# Patient Record
Sex: FEMALE | Race: WHITE | URBAN - METROPOLITAN AREA
[De-identification: names, ages, dates, MRNs, and addresses within clinical notes are randomized per-mention and may not be internally consistent; named-entity substitution may affect disease eponyms.]

---

## 2018-04-17 ENCOUNTER — TRANSFERRED RECORDS (OUTPATIENT)
Dept: HEALTH INFORMATION MANAGEMENT | Facility: CLINIC | Age: 63
End: 2018-04-17

## 2018-04-18 ENCOUNTER — TRANSFERRED RECORDS (OUTPATIENT)
Dept: HEALTH INFORMATION MANAGEMENT | Facility: CLINIC | Age: 63
End: 2018-04-18

## 2018-04-26 ENCOUNTER — TRANSFERRED RECORDS (OUTPATIENT)
Dept: HEALTH INFORMATION MANAGEMENT | Facility: CLINIC | Age: 63
End: 2018-04-26

## 2018-05-01 ENCOUNTER — TRANSFERRED RECORDS (OUTPATIENT)
Dept: HEALTH INFORMATION MANAGEMENT | Facility: CLINIC | Age: 63
End: 2018-05-01

## 2018-05-02 ENCOUNTER — PRE VISIT (OUTPATIENT)
Dept: ONCOLOGY | Facility: CLINIC | Age: 63
End: 2018-05-02

## 2018-05-02 NOTE — TELEPHONE ENCOUNTER
1.  Date of consult: 5/24/18    2.  Reason for consult: Endometrial carcinoma    3.  Referring provider/facility: Mayi Young - [facility]    4. Scheduled by: Clinic       5.  Outside records requested from: [facility]    6.  Additional Information:

## 2018-05-04 NOTE — TELEPHONE ENCOUNTER
5/4/18 - Received pathology slides from [facility] - sent to [facility].

## 2018-05-07 PROCEDURE — 00000346 ZZHCL STATISTIC REVIEW OUTSIDE SLIDES TC 88321: Performed by: OBSTETRICS & GYNECOLOGY

## 2018-05-16 LAB — COPATH REPORT: NORMAL

## 2018-05-23 NOTE — PROGRESS NOTES
Consult Notes on Referred Patient        Dr. Mayi Young MD  [facility]  [address] [place] [address]       RE: Jasmin Lopez  : 1955  ZULMA: 2018    Dear Dr. Mayi Young:    I had the pleasure of seeing your patient Jasmin Lopez here at the Gynecologic Cancer Clinic at the [facility] on 2018.  As you know she is a very pleasant 62 year old woman with a recent diagnosis of grade 1 endometrial adenocarcinoma.  Given these findings she was subsequently sent to the Gynecologic Cancer Clinic for new patient consultation.  She is accompanied today by her  and daughter.    Patient presented with PMB.    18:  US pelvis:  Uterus measures 9.1 x 4.3 x 5.4 cm with EMS of 19 mm.  There is a 3.8 cm posterior uterine fibroid.  Non-visualization of the ovaries    18:  EMB:  Endometrial yqjzrbwtdldcbm-Lr-aaqy at the [place], Grade 1 endometrial adenocarcinoma      Review of Systems:  Systemic           no weight changes; no fever; no chills; no night sweats; no appetite changes  Skin           no rashes, or lesions; + changes in moles  Eye           no irritation; no changes in vision; + spots/floaters  Yoshi-Laryngeal           no dysphagia; no hoarseness; + ringing in ears  Pulmonary    no cough; no shortness of breath  Cardiovascular    no chest pain; no palpitations  Gastrointestinal    no diarrhea; no constipation; no abdominal pain; no changes in bowel  habits; no blood in stool  Genitourinary   no urinary frequency; no urinary urgency; no dysuria; no pain; no abnormal vaginal discharge; + abnormal vaginal bleeding  Breast    no breast discharge; no breast changes; no breast pain  Musculoskeletal    no myalgias; no arthralgias; no back pain  Psychiatric           no depressed mood; no anxiety    Hematologic            no tender lymph nodes; no noticeable swellings or lumps   Endocrine    no hot flashes; no heat/cold intolerance          Neurological   no tremor; no numbness and tingling; no headaches; + difficulty sleeping; + dizziness    Obstetrics and Gynecology History:  ,  x 3  No HRT      Past Medical History:  Past Medical History:   Diagnosis Date     Depression      Diabetes (H)      Endometrial cancer (H)      Hypothyroid        Past Surgical History:  Past Surgical History:   Procedure Laterality Date     bowel obstruction      no laparotomy     CHOLECYSTECTOMY         Health Maintenance:  Last Pap Smear: 18              Result: normal, HPV negative  She has had a history of abnormal Pap smears.    Last Mammogram: Several years ago              Result: normal      She has not had a history of abnormal mammograms.    Last Colonoscopy: Never                    Current Medications:   has a current medication list which includes the following prescription(s): aspirin, calcium citrate-vitamin d, cetirizine, levothyroxine sodium, losartan potassium, metformin hcl, multiple vitamins-minerals, psyllium, sertraline hcl, simvastatin, and UNABLE TO FIND.     Allergies:   Amoxicillin; Augmentin; and Bactrim [sulfamethoxazole w/trimethoprim]      Social History:  Social History     Social History     Marital status:      Spouse name: N/A     Number of children: N/A     Years of education: N/A     Occupational History     Not on file.     Social History Main Topics     Smoking status: Never Smoker     Smokeless tobacco: Not on file     Alcohol use No     Drug use: No     Sexual activity: Not on file     Other Topics Concern     Not on file     Social History Narrative     No narrative on file       Lives with , feels safe at home.  Works part time as a sitter at the hospital.  Enjoys going out to eat, walking, going to [employer].  Does not have an advanced directive on file and would like her  to be her POA.  Full code.    Family History:   The patient's family history is significant for.  Family History   Problem  Relation Age of Onset     Thyroid Cancer Sister          Physical Exam:   /81  Pulse 82  Temp 98  F (36.7  C)  Resp 15  Ht 1.524 m (5')  Wt 73 kg (161 lb)  SpO2 96%  BMI 31.44 kg/m2  Body mass index is 31.44 kg/(m^2).    General Appearance: healthy and alert, no distress     HEENT:  no thyromegaly, no palpable nodules or masses        Cardiovascular: regular rate and rhythm, no gallops, rubs or murmurs     Respiratory: lungs clear, no rales, rhonchi or wheezes, normal diaphragmatic excursion    Musculoskeletal: extremities non tender and without edema    Skin: no lesions or rashes     Neurological: normal gait, no gross defects     Psychiatric: appropriate mood and affect                               Hematological: normal cervical, supraclavicular and inguinal lymph nodes     Gastrointestinal:       abdomen soft, non-tender, non-distended, no organomegaly or masses    Genitourinary: External genitalia and urethral meatus appears normal.  Vagina is smooth without nodularity or masses.  Cervix appears normal and without lesions.  Bimanual exam reveal no masses, nodularity or fullness.  Recto-vaginal exam confirms these findings.      Assessment:    Jasmin Lopez is a 62 year old woman with a new diagnosis of grade 1 endometrial adenocarcinoma.     A total of 60 minutes was spent with the patient, >50% of which were spent in counseling the patient and/or treatment planning.      Plan:     1.)    Grade 1 endometrial adenocarcinoma. Discussed natural history and progression of disease.  Discussed standard surgical staging procedure.  Discussed role of frozen section analysis and that further surgical decisions would be based on these findings.  Discussed the small possibility of adjuvant post-operative therapy.  Risks, benefits, indications and alternatives discussed with the patient.  All her questions were answered and consents were signed for laparoscopic removal of uterus, cervix, both ovaries and  fallopian tubes, possible removal of lymph nodes,possible open, cystoscopy on 5/31     2.) Genetic risk factors were assessed and the patient does not meet the qualifications for a referral.      3.) Labs and/or tests ordered include:  CBC, CMP, T/S, EKG.     4.) Health maintenance issues addressed today include pt is due for a colonoscopy and mammogram which will be addressed following surgery.    5.) Pre-op teaching was completed today.        Thank you for allowing us to participate in the care of your patient.         Sincerely,    Christine Dawson MD  Gynecologic Oncology  [facility]       HARDIK CABALLERO

## 2018-05-24 ENCOUNTER — ONCOLOGY VISIT (OUTPATIENT)
Dept: ONCOLOGY | Facility: CLINIC | Age: 63
End: 2018-05-24
Payer: COMMERCIAL

## 2018-05-24 VITALS
DIASTOLIC BLOOD PRESSURE: 81 MMHG | HEART RATE: 82 BPM | OXYGEN SATURATION: 96 % | SYSTOLIC BLOOD PRESSURE: 132 MMHG | RESPIRATION RATE: 15 BRPM | TEMPERATURE: 98 F | HEIGHT: 60 IN | BODY MASS INDEX: 31.61 KG/M2 | WEIGHT: 161 LBS

## 2018-05-24 DIAGNOSIS — C54.1 ENDOMETRIAL CANCER (H): Primary | ICD-10-CM

## 2018-05-24 DIAGNOSIS — C54.1 ENDOMETRIAL CANCER (H): ICD-10-CM

## 2018-05-24 LAB
ALBUMIN SERPL-MCNC: 3.8 G/DL (ref 3.4–5)
ALP SERPL-CCNC: 64 U/L (ref 40–150)
ALT SERPL W P-5'-P-CCNC: 22 U/L (ref 0–50)
ANION GAP SERPL CALCULATED.3IONS-SCNC: 5 MMOL/L (ref 3–14)
AST SERPL W P-5'-P-CCNC: 22 U/L (ref 0–45)
BASOPHILS # BLD AUTO: 0 10E9/L (ref 0–0.2)
BASOPHILS NFR BLD AUTO: 0.7 %
BILIRUB SERPL-MCNC: 0.3 MG/DL (ref 0.2–1.3)
BUN SERPL-MCNC: 11 MG/DL (ref 7–30)
CALCIUM SERPL-MCNC: 9.2 MG/DL (ref 8.5–10.1)
CHLORIDE SERPL-SCNC: 105 MMOL/L (ref 94–109)
CO2 SERPL-SCNC: 29 MMOL/L (ref 20–32)
CREAT SERPL-MCNC: 0.67 MG/DL (ref 0.52–1.04)
DIFFERENTIAL METHOD BLD: NORMAL
EOSINOPHIL # BLD AUTO: 0.1 10E9/L (ref 0–0.7)
EOSINOPHIL NFR BLD AUTO: 2.2 %
ERYTHROCYTE [DISTWIDTH] IN BLOOD BY AUTOMATED COUNT: 11.9 % (ref 10–15)
GFR SERPL CREATININE-BSD FRML MDRD: 89 ML/MIN/1.7M2
GLUCOSE SERPL-MCNC: 131 MG/DL (ref 70–99)
HCT VFR BLD AUTO: 40 % (ref 35–47)
HGB BLD-MCNC: 13.7 G/DL (ref 11.7–15.7)
IMM GRANULOCYTES # BLD: 0 10E9/L (ref 0–0.4)
IMM GRANULOCYTES NFR BLD: 0.2 %
LYMPHOCYTES # BLD AUTO: 1.6 10E9/L (ref 0.8–5.3)
LYMPHOCYTES NFR BLD AUTO: 28.5 %
MCH RBC QN AUTO: 31.6 PG (ref 26.5–33)
MCHC RBC AUTO-ENTMCNC: 34.3 G/DL (ref 31.5–36.5)
MCV RBC AUTO: 92 FL (ref 78–100)
MONOCYTES # BLD AUTO: 0.4 10E9/L (ref 0–1.3)
MONOCYTES NFR BLD AUTO: 6.5 %
NEUTROPHILS # BLD AUTO: 3.4 10E9/L (ref 1.6–8.3)
NEUTROPHILS NFR BLD AUTO: 61.9 %
PLATELET # BLD AUTO: 179 10E9/L (ref 150–450)
POTASSIUM SERPL-SCNC: 4 MMOL/L (ref 3.4–5.3)
PROT SERPL-MCNC: 7.5 G/DL (ref 6.8–8.8)
RBC # BLD AUTO: 4.33 10E12/L (ref 3.8–5.2)
SODIUM SERPL-SCNC: 139 MMOL/L (ref 133–144)
WBC # BLD AUTO: 5.5 10E9/L (ref 4–11)

## 2018-05-24 PROCEDURE — 80053 COMPREHEN METABOLIC PANEL: CPT | Performed by: OBSTETRICS & GYNECOLOGY

## 2018-05-24 PROCEDURE — 86900 BLOOD TYPING SEROLOGIC ABO: CPT | Performed by: OBSTETRICS & GYNECOLOGY

## 2018-05-24 PROCEDURE — 85025 COMPLETE CBC W/AUTO DIFF WBC: CPT | Performed by: OBSTETRICS & GYNECOLOGY

## 2018-05-24 PROCEDURE — 86901 BLOOD TYPING SEROLOGIC RH(D): CPT | Performed by: OBSTETRICS & GYNECOLOGY

## 2018-05-24 PROCEDURE — 86850 RBC ANTIBODY SCREEN: CPT | Performed by: OBSTETRICS & GYNECOLOGY

## 2018-05-24 PROCEDURE — 36415 COLL VENOUS BLD VENIPUNCTURE: CPT | Performed by: OBSTETRICS & GYNECOLOGY

## 2018-05-24 PROCEDURE — 99205 OFFICE O/P NEW HI 60 MIN: CPT | Performed by: OBSTETRICS & GYNECOLOGY

## 2018-05-24 PROCEDURE — 93000 ELECTROCARDIOGRAM COMPLETE: CPT | Performed by: OBSTETRICS & GYNECOLOGY

## 2018-05-24 RX ORDER — CETIRIZINE HYDROCHLORIDE 10 MG/1
10 TABLET ORAL DAILY
COMMUNITY

## 2018-05-24 RX ORDER — CEFAZOLIN SODIUM 1 G
1 VIAL (EA) INJECTION SEE ADMIN INSTRUCTIONS
Status: CANCELLED | OUTPATIENT
Start: 2018-05-24

## 2018-05-24 RX ORDER — PHENAZOPYRIDINE HYDROCHLORIDE 100 MG/1
200 TABLET, FILM COATED ORAL ONCE
Status: CANCELLED | OUTPATIENT
Start: 2018-05-24 | End: 2018-05-24

## 2018-05-24 RX ORDER — HEPARIN SODIUM 10000 [USP'U]/ML
5000 INJECTION, SOLUTION INTRAVENOUS; SUBCUTANEOUS
Status: CANCELLED | OUTPATIENT
Start: 2018-05-24

## 2018-05-24 ASSESSMENT — PAIN SCALES - GENERAL: PAINLEVEL: NO PAIN (0)

## 2018-05-24 NOTE — NURSING NOTE
Patient Education Note - [facility]    Relevant Diagnosis:  Endometrial cancer    Teaching Topic:  Laparoscopic removal of uterus, cervix, both ovaries and fallopian tubes, possible removal of lymph nodes, possible open, cystoscopy    Person(s) involved in teaching:  Patient and     Motivation Level:    Asks Questions:   Yes  Eager to Learn:  Yes  Cooperative:  Yes  Receptive (willing/able to accept information):  Yes  Comments:  None    Patient demonstrates understanding of the following:  Reason for the appointment, diagnosis and treatment plan:  Yes  Knowledge of proper use of medications and conditions for which they are ordered (with special attention to potential side effects or drug interactions):  Yes  Which situations necessitate calling provider and whom to contact:  Yes    Teaching Concerns:  No    Education/Instructional Materials Used/Given:     Before Your Surgery Booklet ([place])  Showering Before Surgery, CHG prep provided  Adult Pain Assessment Tool  Lymphedema: A Patient Resource Guide  Hysterectomy Guidelines   Home Care after Major Abdominal or Vaginal Surgery  [facility]  Accommodations Brochure   Phone numbers for [facility] and [address] Given to Patient    EKG: Completed in clinic  Lab: Completed in clinic  Chest xray: Not ordered    Post-op exam: 6/21/18 at 12:30 pm    Pre-op exam: N/A    Time spent teaching with patient:  25 minutes    Surgical consent forms, along with copy of EKG, faxed to [facility] at fax number [phone].  Surgery scheduling staff at [facility] also notified.    Jeramy Irwin, RN, BSN, OCN  Oncology Care Coordinator  [facility]

## 2018-05-24 NOTE — LETTER
2018         RE: Jasmin Lopez  [address]  [place] [address]        Dear Colleague,    Thank you for referring your patient, Jasmin Lopez, to the [facility]. Please see a copy of my visit note below.    Consult Notes on Referred Patient        Dr. Mayi Young MD  [facility]  [address] [place] [address]       RE: Jasmin Lopez  : 1955  ZULMA: 2018    Dear Dr. Mayi Young:    I had the pleasure of seeing your patient Jasmin Lopez here at the Gynecologic Cancer Clinic at the [facility] on 2018.  As you know she is a very pleasant 62 year old woman with a recent diagnosis of grade 1 endometrial adenocarcinoma.  Given these findings she was subsequently sent to the Gynecologic Cancer Clinic for new patient consultation.  She is accompanied today by her  and daughter.    Patient presented with PMB.    18:  US pelvis:  Uterus measures 9.1 x 4.3 x 5.4 cm with EMS of 19 mm.  There is a 3.8 cm posterior uterine fibroid.  Non-visualization of the ovaries    18:  EMB:  Endometrial bzcoqydsnuqdqo-Jk-mpfh at the [place], Grade 1 endometrial adenocarcinoma      Review of Systems:  Systemic           no weight changes; no fever; no chills; no night sweats; no appetite changes  Skin           no rashes, or lesions; + changes in moles  Eye           no irritation; no changes in vision; + spots/floaters  Yoshi-Laryngeal           no dysphagia; no hoarseness; + ringing in ears  Pulmonary    no cough; no shortness of breath  Cardiovascular    no chest pain; no palpitations  Gastrointestinal    no diarrhea; no constipation; no abdominal pain; no changes in bowel  habits; no blood in stool  Genitourinary   no urinary frequency; no urinary urgency; no dysuria; no pain; no abnormal vaginal discharge; + abnormal vaginal bleeding  Breast    no breast discharge; no breast changes; no breast pain  Musculoskeletal    no myalgias; no  arthralgias; no back pain  Psychiatric           no depressed mood; no anxiety    Hematologic            no tender lymph nodes; no noticeable swellings or lumps   Endocrine    no hot flashes; no heat/cold intolerance         Neurological   no tremor; no numbness and tingling; no headaches; + difficulty sleeping; + dizziness    Obstetrics and Gynecology History:  ,  x 3  No HRT      Past Medical History:  Past Medical History:   Diagnosis Date     Depression      Diabetes (H)      Endometrial cancer (H)      Hypothyroid        Past Surgical History:  Past Surgical History:   Procedure Laterality Date     bowel obstruction      no laparotomy     CHOLECYSTECTOMY         Health Maintenance:  Last Pap Smear: 18              Result: normal, HPV negative  She has had a history of abnormal Pap smears.    Last Mammogram: Several years ago              Result: normal      She has not had a history of abnormal mammograms.    Last Colonoscopy: Never                    Current Medications:   has a current medication list which includes the following prescription(s): aspirin, calcium citrate-vitamin d, cetirizine, levothyroxine sodium, losartan potassium, metformin hcl, multiple vitamins-minerals, psyllium, sertraline hcl, simvastatin, and UNABLE TO FIND.     Allergies:   Amoxicillin; Augmentin; and Bactrim [sulfamethoxazole w/trimethoprim]      Social History:  Social History     Social History     Marital status:      Spouse name: N/A     Number of children: N/A     Years of education: N/A     Occupational History     Not on file.     Social History Main Topics     Smoking status: Never Smoker     Smokeless tobacco: Not on file     Alcohol use No     Drug use: No     Sexual activity: Not on file     Other Topics Concern     Not on file     Social History Narrative     No narrative on file       Lives with , feels safe at home.  Works part time as a sitter at the hospital.  Enjoys going out to eat,  walking, going to [employer].  Does not have an advanced directive on file and would like her  to be her POA.  Full code.    Family History:   The patient's family history is significant for.  Family History   Problem Relation Age of Onset     Thyroid Cancer Sister          Physical Exam:   /81  Pulse 82  Temp 98  F (36.7  C)  Resp 15  Ht 1.524 m (5')  Wt 73 kg (161 lb)  SpO2 96%  BMI 31.44 kg/m2  Body mass index is 31.44 kg/(m^2).    General Appearance: healthy and alert, no distress     HEENT:  no thyromegaly, no palpable nodules or masses        Cardiovascular: regular rate and rhythm, no gallops, rubs or murmurs     Respiratory: lungs clear, no rales, rhonchi or wheezes, normal diaphragmatic excursion    Musculoskeletal: extremities non tender and without edema    Skin: no lesions or rashes     Neurological: normal gait, no gross defects     Psychiatric: appropriate mood and affect                               Hematological: normal cervical, supraclavicular and inguinal lymph nodes     Gastrointestinal:       abdomen soft, non-tender, non-distended, no organomegaly or masses    Genitourinary: External genitalia and urethral meatus appears normal.  Vagina is smooth without nodularity or masses.  Cervix appears normal and without lesions.  Bimanual exam reveal no masses, nodularity or fullness.  Recto-vaginal exam confirms these findings.      Assessment:    Jasmin Lopez is a 62 year old woman with a new diagnosis of grade 1 endometrial adenocarcinoma.     A total of 60 minutes was spent with the patient, >50% of which were spent in counseling the patient and/or treatment planning.      Plan:     1.)    Grade 1 endometrial adenocarcinoma. Discussed natural history and progression of disease.  Discussed standard surgical staging procedure.  Discussed role of frozen section analysis and that further surgical decisions would be based on these findings.  Discussed the small possibility of adjuvant  post-operative therapy.  Risks, benefits, indications and alternatives discussed with the patient.  All her questions were answered and consents were signed for laparoscopic removal of uterus, cervix, both ovaries and fallopian tubes, possible removal of lymph nodes,possible open, cystoscopy on 5/31     2.) Genetic risk factors were assessed and the patient does not meet the qualifications for a referral.      3.) Labs and/or tests ordered include:  CBC, CMP, T/S, EKG.     4.) Health maintenance issues addressed today include pt is due for a colonoscopy and mammogram which will be addressed following surgery.    5.) Pre-op teaching was completed today.        Thank you for allowing us to participate in the care of your patient.         Sincerely,    Christine Dawson MD  Gynecologic Oncology  [facility]       HARDIK CABALLERO

## 2018-05-24 NOTE — MR AVS SNAPSHOT
After Visit Summary   5/24/2018    Jasmin Lopez    MRN: [id number]           Patient Information     Date Of Birth          1955        Visit Information        Provider Department      5/24/2018 12:30 PM Christine Olmos MD [facility]        Today's Diagnoses     Endometrial cancer (H)    -  1      Care Instructions    You have been scheduled for surgery on: 5/31    Diagnosis:  Endometrial cancer    The surgical procedure is: Laparoscopic removal of uterus, cervix, both ovaries and fallopian tubes, possible removal of lymph nodes, possible open, cystoscopy    Anticipated length of surgery: 2-3 hrs.  You will be in the recovery room for approximately 2-3 hrs after surgery.  Your family will not be able to see you until after you leave the recovery room.    Length of hospital stay:  This is an outpatient, extended stay surgery.  You will be discharged same day if you meet criteria for discharge.  If you have a larger surgical incision, you will need to be in the hospital 2-3 days.  ______________________________________________________________________    Preparation for Surgery:    To Schedule   1.  Labs:  CBC, CMP, T/S, orders placed, please perform today   2.  EKG:  Order placed, please perform today   3.  Post-operative exam with me 1-2 weeks following surgery      Postoperative Restrictions:  No heavy lifting >20lbs for six weeks, nothing in the vagina (no tampons, no intercourse, no douching) for eight weeks.     Postoperative visit:  Return to clinic 1-2 weeks after surgery for post operative visit.      Please contact the clinic with any questions or concerns.    Christine Dawson MD  Gynecologic Oncology  [facility]               Follow-ups after your visit        Your next 10 appointments already scheduled     Jun 21, 2018 12:30 PM CDT   Return Visit with Christine Galindo MD   [facility] ([facility])     [address] [address] [place] [id number] [phone]              Who to contact     If you have questions or need follow up information about today's clinic visit or your schedule please contact [facility] directly at [phone].  Normal or non-critical lab and imaging results will be communicated to you by MyChart, letter or phone within 4 business days after the clinic has received the results. If you do not hear from us within 7 days, please contact the clinic through MyChart or phone. If you have a critical or abnormal lab result, we will notify you by phone as soon as possible.  Submit refill requests through [employer] or call your pharmacy and they will forward the refill request to us. Please allow 3 business days for your refill to be completed.          Additional Information About Your Visit        Care EveryWhere ID     This is your Care EveryWhere ID. This could be used by other organizations to access your [place] medical records  [id number]        Your Vitals Were     Pulse Temperature Respirations Height Pulse Oximetry BMI (Body Mass Index)    82 98  F (36.7  C) 15 1.524 m (5') 96% 31.44 kg/m2       Blood Pressure from Last 3 Encounters:   05/24/18 132/81    Weight from Last 3 Encounters:   05/24/18 73 kg (161 lb)              We Performed the Following     EKG 12-Lead Complete w/read - Clinics     Tiffanie-Operative Worksheet (Blank)        Primary Care Provider Office Phone # Fax #    Mayi Young [phone]       [facility] MED [facility] [address] [place] [id number]        Equal Access to Services     [facility] Center[facility] : Hadii abhishek figueredo hadasho Sobebaali, waaxda luqadaha, qaybta kaalmada adeegyada, suzanne quintana. So [facility] [phone].    ATENCIÓN: Si habla español, tiene a smith disposición servicios gratuitos de asistencia lingüística. Zenon al [phone].    We comply with applicable federal civil rights laws and  [place] laws. We do not discriminate on the basis of race, color, national origin, age, disability, sex, sexual orientation, or gender identity.            Thank you!     Thank you for choosing [facility]  for your care. Our goal is always to provide you with excellent care. Hearing back from our patients is one way we can continue to improve our services. Please take a few minutes to complete the written survey that you may receive in the mail after your visit with us. Thank you!             Your Updated Medication List - Protect others around you: Learn how to safely use, store and throw away your medicines at www.disposemymeds.org.          This list is accurate as of 5/24/18  2:58 PM.  Always use your most recent med list.                   Brand Name Dispense Instructions for use Diagnosis    aspirin 81 MG tablet      Take by mouth daily        CALCIUM + D PO           cetirizine 10 MG tablet    zyrTEC     Take 10 mg by mouth daily        LEVOTHYROXINE SODIUM PO           LOSARTAN POTASSIUM PO           METFORMIN HCL PO           MULTIVITAMIN ADULT PO           psyllium Packet    METAMUCIL/KONSYL     Take 1 packet by mouth daily        SERTRALINE HCL PO      Take by mouth daily        SIMVASTATIN PO           UNABLE TO FIND      MEDICATION NAME: sitagliptin

## 2018-05-24 NOTE — PATIENT INSTRUCTIONS
You have been scheduled for surgery on: 5/31    Diagnosis:  Endometrial cancer    The surgical procedure is: Laparoscopic removal of uterus, cervix, both ovaries and fallopian tubes, possible removal of lymph nodes, possible open, cystoscopy    Anticipated length of surgery: 2-3 hrs.  You will be in the recovery room for approximately 2-3 hrs after surgery.  Your family will not be able to see you until after you leave the recovery room.    Length of hospital stay:  This is an outpatient, extended stay surgery.  You will be discharged same day if you meet criteria for discharge.  If you have a larger surgical incision, you will need to be in the hospital 2-3 days.  ______________________________________________________________________    Preparation for Surgery:    To Schedule   1.  Labs:  CBC, CMP, T/S, orders placed, please perform today   2.  EKG:  Order placed, please perform today   3.  Post-operative exam with me 1-2 weeks following surgery      Postoperative Restrictions:  No heavy lifting >20lbs for six weeks, nothing in the vagina (no tampons, no intercourse, no douching) for eight weeks.     Postoperative visit:  Return to clinic 1-2 weeks after surgery for post operative visit.      Please contact the clinic with any questions or concerns.    Christine Dawson MD  Gynecologic Oncology  [facility]

## 2018-05-24 NOTE — NURSING NOTE
Oncology Rooming Note    May 24, 2018 1:09 PM   Jasmin Lopez is a 62 year old female who presents for:    Chief Complaint   Patient presents with     Oncology Clinic Visit     new patient     Initial Vitals: /81  Pulse 82  Temp 98  F (36.7  C)  Resp 15  Ht 1.524 m (5')  Wt 73 kg (161 lb)  SpO2 96%  BMI 31.44 kg/m2 Estimated body mass index is 31.44 kg/(m^2) as calculated from the following:    Height as of this encounter: 1.524 m (5').    Weight as of this encounter: 73 kg (161 lb). Body surface area is 1.76 meters squared.  No Pain (0) Comment: Data Unavailable   No LMP recorded.  Allergies reviewed: Yes  Medications reviewed: Yes    Medications: Medication refills not needed today.  Pharmacy name entered into [employer]: [facility] [address] [place] [address] - [address]        5 minutes for nursing intake (face to face time)     Saniya Padgett LPN

## 2018-05-25 ENCOUNTER — TELEPHONE (OUTPATIENT)
Dept: ONCOLOGY | Facility: CLINIC | Age: 63
End: 2018-05-25

## 2018-05-25 ENCOUNTER — TELEPHONE (OUTPATIENT)
Dept: SURGERY | Facility: CLINIC | Age: 63
End: 2018-05-25

## 2018-05-31 ENCOUNTER — ANESTHESIA EVENT (OUTPATIENT)
Dept: SURGERY | Facility: CLINIC | Age: 63
End: 2018-05-31
Payer: COMMERCIAL

## 2018-05-31 ENCOUNTER — SURGERY (OUTPATIENT)
Age: 63
End: 2018-05-31
Payer: COMMERCIAL

## 2018-05-31 ENCOUNTER — HOSPITAL ENCOUNTER (OUTPATIENT)
Facility: CLINIC | Age: 63
Setting detail: OBSERVATION
Discharge: HOME | End: 2018-06-01
Attending: OBSTETRICS & GYNECOLOGY | Admitting: OBSTETRICS & GYNECOLOGY
Payer: COMMERCIAL

## 2018-05-31 ENCOUNTER — ANESTHESIA (OUTPATIENT)
Dept: SURGERY | Facility: CLINIC | Age: 63
End: 2018-05-31
Payer: COMMERCIAL

## 2018-05-31 DIAGNOSIS — Z90.710 S/P LAPAROSCOPIC HYSTERECTOMY: Primary | ICD-10-CM

## 2018-05-31 LAB
ABO + RH BLD: NORMAL
ABO + RH BLD: NORMAL
BLD GP AB SCN SERPL QL: NORMAL
BLOOD BANK CMNT PATIENT-IMP: NORMAL
GLUCOSE BLDC GLUCOMTR-MCNC: 120 MG/DL (ref 70–99)
GLUCOSE BLDC GLUCOMTR-MCNC: 128 MG/DL (ref 70–99)
GLUCOSE BLDC GLUCOMTR-MCNC: 160 MG/DL (ref 70–99)
GLUCOSE BLDC GLUCOMTR-MCNC: 199 MG/DL (ref 70–99)
GLUCOSE BLDC GLUCOMTR-MCNC: 237 MG/DL (ref 70–99)
GLUCOSE BLDC GLUCOMTR-MCNC: 301 MG/DL (ref 70–99)
SPECIMEN EXP DATE BLD: NORMAL

## 2018-05-31 PROCEDURE — 25000566 ZZH SEVOFLURANE, EA 15 MIN: Performed by: OBSTETRICS & GYNECOLOGY

## 2018-05-31 PROCEDURE — 88305 TISSUE EXAM BY PATHOLOGIST: CPT | Performed by: OBSTETRICS & GYNECOLOGY

## 2018-05-31 PROCEDURE — 71000015 ZZH RECOVERY PHASE 1 LEVEL 2 EA ADDTL HR: Performed by: OBSTETRICS & GYNECOLOGY

## 2018-05-31 PROCEDURE — 44604 SUTURE LARGE INTESTINE: CPT | Mod: 59 | Performed by: OBSTETRICS & GYNECOLOGY

## 2018-05-31 PROCEDURE — G0378 HOSPITAL OBSERVATION PER HR: HCPCS

## 2018-05-31 PROCEDURE — 25000125 ZZHC RX 250: Performed by: NURSE ANESTHETIST, CERTIFIED REGISTERED

## 2018-05-31 PROCEDURE — 82962 GLUCOSE BLOOD TEST: CPT

## 2018-05-31 PROCEDURE — 71000014 ZZH RECOVERY PHASE 1 LEVEL 2 FIRST HR: Performed by: OBSTETRICS & GYNECOLOGY

## 2018-05-31 PROCEDURE — 40000170 ZZH STATISTIC PRE-PROCEDURE ASSESSMENT II: Performed by: OBSTETRICS & GYNECOLOGY

## 2018-05-31 PROCEDURE — 25000132 ZZH RX MED GY IP 250 OP 250 PS 637: Performed by: OBSTETRICS & GYNECOLOGY

## 2018-05-31 PROCEDURE — 25000128 H RX IP 250 OP 636: Performed by: OBSTETRICS & GYNECOLOGY

## 2018-05-31 PROCEDURE — 37000009 ZZH ANESTHESIA TECHNICAL FEE, EACH ADDTL 15 MIN: Performed by: OBSTETRICS & GYNECOLOGY

## 2018-05-31 PROCEDURE — 88341 IMHCHEM/IMCYTCHM EA ADD ANTB: CPT | Performed by: OBSTETRICS & GYNECOLOGY

## 2018-05-31 PROCEDURE — 25000128 H RX IP 250 OP 636: Performed by: ANESTHESIOLOGY

## 2018-05-31 PROCEDURE — 00000155 ZZHCL STATISTIC H-CELL BLOCK W/STAIN: Performed by: OBSTETRICS & GYNECOLOGY

## 2018-05-31 PROCEDURE — 27210794 ZZH OR GENERAL SUPPLY STERILE: Performed by: OBSTETRICS & GYNECOLOGY

## 2018-05-31 PROCEDURE — C9399 UNCLASSIFIED DRUGS OR BIOLOG: HCPCS | Performed by: NURSE ANESTHETIST, CERTIFIED REGISTERED

## 2018-05-31 PROCEDURE — 88112 CYTOPATH CELL ENHANCE TECH: CPT | Performed by: OBSTETRICS & GYNECOLOGY

## 2018-05-31 PROCEDURE — 36000064 ZZH SURGERY LEVEL 4 EA 15 ADDTL MIN - UMMC: Performed by: OBSTETRICS & GYNECOLOGY

## 2018-05-31 PROCEDURE — 58571 TLH W/T/O 250 G OR LESS: CPT | Mod: GC | Performed by: OBSTETRICS & GYNECOLOGY

## 2018-05-31 PROCEDURE — 88309 TISSUE EXAM BY PATHOLOGIST: CPT | Performed by: OBSTETRICS & GYNECOLOGY

## 2018-05-31 PROCEDURE — 37000008 ZZH ANESTHESIA TECHNICAL FEE, 1ST 30 MIN: Performed by: OBSTETRICS & GYNECOLOGY

## 2018-05-31 PROCEDURE — 88342 IMHCHEM/IMCYTCHM 1ST ANTB: CPT | Performed by: OBSTETRICS & GYNECOLOGY

## 2018-05-31 PROCEDURE — 36000062 ZZH SURGERY LEVEL 4 1ST 30 MIN - UMMC: Performed by: OBSTETRICS & GYNECOLOGY

## 2018-05-31 PROCEDURE — 25000128 H RX IP 250 OP 636: Performed by: NURSE ANESTHETIST, CERTIFIED REGISTERED

## 2018-05-31 PROCEDURE — 96372 THER/PROPH/DIAG INJ SC/IM: CPT

## 2018-05-31 PROCEDURE — 25000132 ZZH RX MED GY IP 250 OP 250 PS 637: Performed by: ANESTHESIOLOGY

## 2018-05-31 PROCEDURE — 88331 PATH CONSLTJ SURG 1 BLK 1SPC: CPT | Performed by: OBSTETRICS & GYNECOLOGY

## 2018-05-31 RX ORDER — IBUPROFEN 600 MG/1
600 TABLET, FILM COATED ORAL EVERY 6 HOURS PRN
Qty: 30 TABLET | Refills: 0 | Status: SHIPPED | OUTPATIENT
Start: 2018-05-31

## 2018-05-31 RX ORDER — NICOTINE POLACRILEX 4 MG
15-30 LOZENGE BUCCAL
Status: DISCONTINUED | OUTPATIENT
Start: 2018-05-31 | End: 2018-06-01 | Stop reason: HOSPADM

## 2018-05-31 RX ORDER — ONDANSETRON 2 MG/ML
4 INJECTION INTRAMUSCULAR; INTRAVENOUS EVERY 30 MIN PRN
Status: DISCONTINUED | OUTPATIENT
Start: 2018-05-31 | End: 2018-05-31 | Stop reason: HOSPADM

## 2018-05-31 RX ORDER — DEXAMETHASONE SODIUM PHOSPHATE 10 MG/ML
INJECTION, SOLUTION INTRAMUSCULAR; INTRAVENOUS PRN
Status: DISCONTINUED | OUTPATIENT
Start: 2018-05-31 | End: 2018-05-31

## 2018-05-31 RX ORDER — SIMVASTATIN 10 MG
10 TABLET ORAL AT BEDTIME
Status: DISCONTINUED | OUTPATIENT
Start: 2018-05-31 | End: 2018-06-01 | Stop reason: HOSPADM

## 2018-05-31 RX ORDER — AMOXICILLIN 250 MG
1-2 CAPSULE ORAL 2 TIMES DAILY
Qty: 30 TABLET | Refills: 0 | Status: SHIPPED | OUTPATIENT
Start: 2018-05-31

## 2018-05-31 RX ORDER — PHENAZOPYRIDINE HYDROCHLORIDE 200 MG/1
200 TABLET, FILM COATED ORAL ONCE
Status: COMPLETED | OUTPATIENT
Start: 2018-05-31 | End: 2018-05-31

## 2018-05-31 RX ORDER — LEVOTHYROXINE SODIUM 137 UG/1
137 TABLET ORAL AT BEDTIME
Status: DISCONTINUED | OUTPATIENT
Start: 2018-05-31 | End: 2018-06-01 | Stop reason: HOSPADM

## 2018-05-31 RX ORDER — LIDOCAINE HYDROCHLORIDE 20 MG/ML
INJECTION, SOLUTION INFILTRATION; PERINEURAL PRN
Status: DISCONTINUED | OUTPATIENT
Start: 2018-05-31 | End: 2018-05-31

## 2018-05-31 RX ORDER — OXYCODONE HYDROCHLORIDE 5 MG/1
5-10 TABLET ORAL
Status: DISCONTINUED | OUTPATIENT
Start: 2018-05-31 | End: 2018-06-01 | Stop reason: HOSPADM

## 2018-05-31 RX ORDER — ONDANSETRON 4 MG/1
4 TABLET, ORALLY DISINTEGRATING ORAL EVERY 6 HOURS PRN
Status: DISCONTINUED | OUTPATIENT
Start: 2018-05-31 | End: 2018-06-01 | Stop reason: HOSPADM

## 2018-05-31 RX ORDER — OXYCODONE HYDROCHLORIDE 5 MG/1
5-10 TABLET ORAL EVERY 4 HOURS PRN
Qty: 18 TABLET | Refills: 0 | Status: SHIPPED | OUTPATIENT
Start: 2018-05-31

## 2018-05-31 RX ORDER — IBUPROFEN 600 MG/1
600 TABLET, FILM COATED ORAL EVERY 6 HOURS PRN
Status: DISCONTINUED | OUTPATIENT
Start: 2018-05-31 | End: 2018-05-31

## 2018-05-31 RX ORDER — NALOXONE HYDROCHLORIDE 0.4 MG/ML
.1-.4 INJECTION, SOLUTION INTRAMUSCULAR; INTRAVENOUS; SUBCUTANEOUS
Status: DISCONTINUED | OUTPATIENT
Start: 2018-05-31 | End: 2018-05-31 | Stop reason: HOSPADM

## 2018-05-31 RX ORDER — KETOROLAC TROMETHAMINE 30 MG/ML
30 INJECTION, SOLUTION INTRAMUSCULAR; INTRAVENOUS EVERY 6 HOURS
Status: DISCONTINUED | OUTPATIENT
Start: 2018-05-31 | End: 2018-06-01 | Stop reason: HOSPADM

## 2018-05-31 RX ORDER — SODIUM CHLORIDE, SODIUM LACTATE, POTASSIUM CHLORIDE, CALCIUM CHLORIDE 600; 310; 30; 20 MG/100ML; MG/100ML; MG/100ML; MG/100ML
INJECTION, SOLUTION INTRAVENOUS CONTINUOUS
Status: DISCONTINUED | OUTPATIENT
Start: 2018-05-31 | End: 2018-05-31 | Stop reason: HOSPADM

## 2018-05-31 RX ORDER — LIDOCAINE 40 MG/G
CREAM TOPICAL
Status: DISCONTINUED | OUTPATIENT
Start: 2018-05-31 | End: 2018-06-01 | Stop reason: HOSPADM

## 2018-05-31 RX ORDER — HYDROMORPHONE HCL/0.9% NACL/PF 0.2MG/0.2
0.2 SYRINGE (ML) INTRAVENOUS
Status: DISCONTINUED | OUTPATIENT
Start: 2018-05-31 | End: 2018-06-01 | Stop reason: HOSPADM

## 2018-05-31 RX ORDER — FENTANYL CITRATE 50 UG/ML
25-50 INJECTION, SOLUTION INTRAMUSCULAR; INTRAVENOUS EVERY 5 MIN PRN
Status: DISCONTINUED | OUTPATIENT
Start: 2018-05-31 | End: 2018-05-31 | Stop reason: HOSPADM

## 2018-05-31 RX ORDER — ACETAMINOPHEN 325 MG/1
975 TABLET ORAL ONCE
Status: COMPLETED | OUTPATIENT
Start: 2018-05-31 | End: 2018-05-31

## 2018-05-31 RX ORDER — ONDANSETRON 2 MG/ML
4 INJECTION INTRAMUSCULAR; INTRAVENOUS EVERY 6 HOURS PRN
Status: DISCONTINUED | OUTPATIENT
Start: 2018-05-31 | End: 2018-06-01 | Stop reason: HOSPADM

## 2018-05-31 RX ORDER — HYDROMORPHONE HYDROCHLORIDE 1 MG/ML
.3-.5 INJECTION, SOLUTION INTRAMUSCULAR; INTRAVENOUS; SUBCUTANEOUS EVERY 10 MIN PRN
Status: DISCONTINUED | OUTPATIENT
Start: 2018-05-31 | End: 2018-05-31 | Stop reason: HOSPADM

## 2018-05-31 RX ORDER — DEXTROSE MONOHYDRATE 25 G/50ML
25-50 INJECTION, SOLUTION INTRAVENOUS
Status: DISCONTINUED | OUTPATIENT
Start: 2018-05-31 | End: 2018-06-01 | Stop reason: HOSPADM

## 2018-05-31 RX ORDER — ONDANSETRON 4 MG/1
4-8 TABLET, ORALLY DISINTEGRATING ORAL EVERY 8 HOURS PRN
Qty: 4 TABLET | Refills: 0 | Status: SHIPPED | OUTPATIENT
Start: 2018-05-31

## 2018-05-31 RX ORDER — ACETAMINOPHEN 325 MG/1
650 TABLET ORAL EVERY 6 HOURS
Status: DISCONTINUED | OUTPATIENT
Start: 2018-05-31 | End: 2018-06-01 | Stop reason: HOSPADM

## 2018-05-31 RX ORDER — HEPARIN SODIUM 5000 [USP'U]/.5ML
5000 INJECTION, SOLUTION INTRAVENOUS; SUBCUTANEOUS
Status: COMPLETED | OUTPATIENT
Start: 2018-05-31 | End: 2018-05-31

## 2018-05-31 RX ORDER — FENTANYL CITRATE 50 UG/ML
25-50 INJECTION, SOLUTION INTRAMUSCULAR; INTRAVENOUS
Status: CANCELLED | OUTPATIENT
Start: 2018-05-31

## 2018-05-31 RX ORDER — AMOXICILLIN 250 MG
1 CAPSULE ORAL 2 TIMES DAILY
Status: DISCONTINUED | OUTPATIENT
Start: 2018-05-31 | End: 2018-06-01 | Stop reason: HOSPADM

## 2018-05-31 RX ORDER — SIMETHICONE 80 MG
80 TABLET,CHEWABLE ORAL 4 TIMES DAILY
Status: DISCONTINUED | OUTPATIENT
Start: 2018-05-31 | End: 2018-06-01 | Stop reason: HOSPADM

## 2018-05-31 RX ORDER — ONDANSETRON 4 MG/1
4 TABLET, ORALLY DISINTEGRATING ORAL EVERY 30 MIN PRN
Status: DISCONTINUED | OUTPATIENT
Start: 2018-05-31 | End: 2018-05-31 | Stop reason: HOSPADM

## 2018-05-31 RX ORDER — ACETAMINOPHEN 325 MG/1
650 TABLET ORAL EVERY 4 HOURS PRN
Qty: 50 TABLET | Refills: 0 | Status: SHIPPED | OUTPATIENT
Start: 2018-05-31

## 2018-05-31 RX ORDER — MEPERIDINE HYDROCHLORIDE 50 MG/ML
12.5 INJECTION INTRAMUSCULAR; INTRAVENOUS; SUBCUTANEOUS
Status: DISCONTINUED | OUTPATIENT
Start: 2018-05-31 | End: 2018-05-31 | Stop reason: HOSPADM

## 2018-05-31 RX ORDER — SODIUM CHLORIDE, SODIUM LACTATE, POTASSIUM CHLORIDE, CALCIUM CHLORIDE 600; 310; 30; 20 MG/100ML; MG/100ML; MG/100ML; MG/100ML
INJECTION, SOLUTION INTRAVENOUS CONTINUOUS PRN
Status: DISCONTINUED | OUTPATIENT
Start: 2018-05-31 | End: 2018-05-31

## 2018-05-31 RX ORDER — CEFAZOLIN SODIUM 2 G/100ML
2 INJECTION, SOLUTION INTRAVENOUS
Status: COMPLETED | OUTPATIENT
Start: 2018-05-31 | End: 2018-05-31

## 2018-05-31 RX ORDER — CEFAZOLIN SODIUM 1 G/3ML
1 INJECTION, POWDER, FOR SOLUTION INTRAMUSCULAR; INTRAVENOUS SEE ADMIN INSTRUCTIONS
Status: DISCONTINUED | OUTPATIENT
Start: 2018-05-31 | End: 2018-05-31 | Stop reason: HOSPADM

## 2018-05-31 RX ORDER — IBUPROFEN 600 MG/1
600 TABLET, FILM COATED ORAL EVERY 6 HOURS PRN
Status: DISCONTINUED | OUTPATIENT
Start: 2018-05-31 | End: 2018-06-01 | Stop reason: HOSPADM

## 2018-05-31 RX ORDER — HYDROXYZINE HYDROCHLORIDE 25 MG/1
25 TABLET, FILM COATED ORAL EVERY 6 HOURS PRN
Status: DISCONTINUED | OUTPATIENT
Start: 2018-05-31 | End: 2018-06-01 | Stop reason: HOSPADM

## 2018-05-31 RX ORDER — SODIUM CHLORIDE, SODIUM LACTATE, POTASSIUM CHLORIDE, CALCIUM CHLORIDE 600; 310; 30; 20 MG/100ML; MG/100ML; MG/100ML; MG/100ML
INJECTION, SOLUTION INTRAVENOUS CONTINUOUS
Status: ACTIVE | OUTPATIENT
Start: 2018-05-31 | End: 2018-06-01

## 2018-05-31 RX ORDER — ONDANSETRON 2 MG/ML
INJECTION INTRAMUSCULAR; INTRAVENOUS PRN
Status: DISCONTINUED | OUTPATIENT
Start: 2018-05-31 | End: 2018-05-31

## 2018-05-31 RX ORDER — LIDOCAINE 40 MG/G
CREAM TOPICAL
Status: DISCONTINUED | OUTPATIENT
Start: 2018-05-31 | End: 2018-05-31 | Stop reason: HOSPADM

## 2018-05-31 RX ORDER — PROPOFOL 10 MG/ML
INJECTION, EMULSION INTRAVENOUS PRN
Status: DISCONTINUED | OUTPATIENT
Start: 2018-05-31 | End: 2018-05-31

## 2018-05-31 RX ORDER — CETIRIZINE HYDROCHLORIDE 10 MG/1
10 TABLET ORAL DAILY
Status: DISCONTINUED | OUTPATIENT
Start: 2018-05-31 | End: 2018-06-01 | Stop reason: HOSPADM

## 2018-05-31 RX ORDER — NALOXONE HYDROCHLORIDE 0.4 MG/ML
.1-.4 INJECTION, SOLUTION INTRAMUSCULAR; INTRAVENOUS; SUBCUTANEOUS
Status: DISCONTINUED | OUTPATIENT
Start: 2018-05-31 | End: 2018-06-01 | Stop reason: HOSPADM

## 2018-05-31 RX ORDER — FENTANYL CITRATE 50 UG/ML
INJECTION, SOLUTION INTRAMUSCULAR; INTRAVENOUS PRN
Status: DISCONTINUED | OUTPATIENT
Start: 2018-05-31 | End: 2018-05-31

## 2018-05-31 RX ORDER — GABAPENTIN 300 MG/1
300 CAPSULE ORAL ONCE
Status: COMPLETED | OUTPATIENT
Start: 2018-05-31 | End: 2018-05-31

## 2018-05-31 RX ORDER — PROCHLORPERAZINE MALEATE 5 MG
10 TABLET ORAL EVERY 6 HOURS PRN
Status: DISCONTINUED | OUTPATIENT
Start: 2018-05-31 | End: 2018-06-01 | Stop reason: HOSPADM

## 2018-05-31 RX ADMIN — FENTANYL CITRATE 25 MCG: 50 INJECTION INTRAMUSCULAR; INTRAVENOUS at 15:40

## 2018-05-31 RX ADMIN — CETIRIZINE HYDROCHLORIDE 10 MG: 10 TABLET, FILM COATED ORAL at 18:55

## 2018-05-31 RX ADMIN — METRONIDAZOLE 500 MG: 500 INJECTION, SOLUTION INTRAVENOUS at 14:55

## 2018-05-31 RX ADMIN — SUGAMMADEX 200 MG: 100 INJECTION, SOLUTION INTRAVENOUS at 15:02

## 2018-05-31 RX ADMIN — KETOROLAC TROMETHAMINE 30 MG: 30 INJECTION, SOLUTION INTRAMUSCULAR at 21:28

## 2018-05-31 RX ADMIN — ROCURONIUM BROMIDE 50 MG: 10 INJECTION INTRAVENOUS at 13:05

## 2018-05-31 RX ADMIN — PROPOFOL 100 MG: 10 INJECTION, EMULSION INTRAVENOUS at 13:05

## 2018-05-31 RX ADMIN — ACETAMINOPHEN 975 MG: 325 TABLET, FILM COATED ORAL at 10:28

## 2018-05-31 RX ADMIN — SODIUM CHLORIDE, POTASSIUM CHLORIDE, SODIUM LACTATE AND CALCIUM CHLORIDE: 600; 310; 30; 20 INJECTION, SOLUTION INTRAVENOUS at 16:21

## 2018-05-31 RX ADMIN — HUMAN INSULIN 3 UNITS: 100 INJECTION, SOLUTION SUBCUTANEOUS at 10:28

## 2018-05-31 RX ADMIN — FENTANYL CITRATE 100 MCG: 50 INJECTION, SOLUTION INTRAMUSCULAR; INTRAVENOUS at 13:05

## 2018-05-31 RX ADMIN — LEVOTHYROXINE SODIUM 137 MCG: 137 TABLET ORAL at 22:29

## 2018-05-31 RX ADMIN — DEXAMETHASONE SODIUM PHOSPHATE 6 MG: 10 INJECTION, SOLUTION INTRAMUSCULAR; INTRAVENOUS at 13:20

## 2018-05-31 RX ADMIN — SIMETHICONE CHEW TAB 80 MG 80 MG: 80 TABLET ORAL at 21:28

## 2018-05-31 RX ADMIN — SODIUM CHLORIDE, POTASSIUM CHLORIDE, SODIUM LACTATE AND CALCIUM CHLORIDE: 600; 310; 30; 20 INJECTION, SOLUTION INTRAVENOUS at 12:51

## 2018-05-31 RX ADMIN — ROCURONIUM BROMIDE 20 MG: 10 INJECTION INTRAVENOUS at 13:57

## 2018-05-31 RX ADMIN — HEPARIN SODIUM 5000 UNITS: 5000 INJECTION, SOLUTION INTRAVENOUS; SUBCUTANEOUS at 11:18

## 2018-05-31 RX ADMIN — SENNOSIDES AND DOCUSATE SODIUM 1 TABLET: 8.6; 5 TABLET ORAL at 21:28

## 2018-05-31 RX ADMIN — CEFAZOLIN SODIUM 2 G: 2 INJECTION, SOLUTION INTRAVENOUS at 13:15

## 2018-05-31 RX ADMIN — SIMVASTATIN 10 MG: 10 TABLET, FILM COATED ORAL at 22:28

## 2018-05-31 RX ADMIN — GABAPENTIN 300 MG: 300 CAPSULE ORAL at 10:28

## 2018-05-31 RX ADMIN — FENTANYL CITRATE 100 MCG: 50 INJECTION, SOLUTION INTRAMUSCULAR; INTRAVENOUS at 14:40

## 2018-05-31 RX ADMIN — HYDROMORPHONE HYDROCHLORIDE 0.5 MG: 1 INJECTION, SOLUTION INTRAMUSCULAR; INTRAVENOUS; SUBCUTANEOUS at 14:55

## 2018-05-31 RX ADMIN — OXYCODONE HYDROCHLORIDE 5 MG: 5 TABLET ORAL at 18:56

## 2018-05-31 RX ADMIN — ONDANSETRON 4 MG: 2 INJECTION INTRAMUSCULAR; INTRAVENOUS at 13:20

## 2018-05-31 RX ADMIN — SERTRALINE HYDROCHLORIDE 100 MG: 50 TABLET ORAL at 18:55

## 2018-05-31 RX ADMIN — KETOROLAC TROMETHAMINE 30 MG: 30 INJECTION, SOLUTION INTRAMUSCULAR at 16:21

## 2018-05-31 RX ADMIN — PHENAZOPYRIDINE HYDROCHLORIDE 200 MG: 200 TABLET, FILM COATED ORAL at 10:28

## 2018-05-31 RX ADMIN — LIDOCAINE HYDROCHLORIDE 100 MG: 20 INJECTION, SOLUTION INFILTRATION; PERINEURAL at 13:05

## 2018-05-31 RX ADMIN — SODIUM CHLORIDE, POTASSIUM CHLORIDE, SODIUM LACTATE AND CALCIUM CHLORIDE: 600; 310; 30; 20 INJECTION, SOLUTION INTRAVENOUS at 15:05

## 2018-05-31 RX ADMIN — ACETAMINOPHEN 650 MG: 325 TABLET, FILM COATED ORAL at 18:55

## 2018-05-31 NOTE — DISCHARGE SUMMARY
Gynecologic Oncology Discharge Summary    Jasmin Lopez  [id number]    Admit Date: 5/31/2018  Discharge Date: 6/1/2018   Admitting Provider: Dr. Dawson  Discharge Provider: Dr. Salmeron    Admission Dx:   -Grade 1 Endometrial Adenocarcinoma  -Depression  -DMII  -Hypothyroidism     Discharge Dx:  -Grade 1 Endometrial Adenocarcinoma  -Depression  -DMII  -Hypothyroidism    Patient Active Problem List   Diagnosis     S/P laparoscopic hysterectomy       Procedures: Laparoscopic Hysterectomy Bilateral Salpingo Oophorectomy, Cystoscopy, mini lap, oversew of transverse colon    Prior to Admission Medications:  Prescriptions Prior to Admission   Medication Sig Dispense Refill Last Dose     aspirin 81 MG tablet Take by mouth daily   Past Week at Unknown time     Calcium Citrate-Vitamin D (CALCIUM + D PO)    Past Week at Unknown time     cetirizine (ZYRTEC) 10 MG tablet Take 10 mg by mouth daily At night   5/30/2018 at pm     insulin aspart (NOVOLOG FLEXPEN) 100 UNIT/ML injection Inject Subcutaneous 3 times daily (with meals) Inject 1 unit per carb choice plus sliding scale   5/30/2018 at 1700     insulin degludec (TRESIBA FLEXTOUCH) 200 UNIT/ML pen Inject Subcutaneous daily Inject 14 units SQ every day   5/30/2018 at pm     LEVOTHYROXINE SODIUM PO Take 137 mcg by mouth At Bedtime    5/30/2018 at am     LOSARTAN POTASSIUM PO    5/30/2018 at am     METFORMIN HCL PO Take 500 mg by mouth 2 times daily (with meals) Take 2 tablets orally 2 times a day   5/30/2018 at pm     Multiple Vitamins-Minerals (MULTIVITAMIN ADULT PO)    Past Week at Unknown time     OMEPRAZOLE PO Take 20 mg by mouth every morning   5/30/2018 at am     psyllium (METAMUCIL/KONSYL) Packet Take 1 packet by mouth daily   Past Month at Unknown time     SERTRALINE HCL PO Take 100 mg by mouth daily    5/30/2018 at am     SIMVASTATIN PO Take 10 mg by mouth    5/30/2018 at pm     SITagliptin Phosphate (JANUVIA PO) Take 100 mg by mouth daily   5/30/2018 at am        Discharge Medications:   Jasmin Lopez   Home Medication Instructions YANELIS:[id number]    Printed on:06/01/18 9419   Medication Information                      acetaminophen (TYLENOL) 325 MG tablet  Take 2 tablets (650 mg) by mouth every 4 hours as needed for other (mild pain)             aspirin 81 MG tablet  Take by mouth daily             Calcium Citrate-Vitamin D (CALCIUM + D PO)               cetirizine (ZYRTEC) 10 MG tablet  Take 10 mg by mouth daily At night             ibuprofen (ADVIL/MOTRIN) 600 MG tablet  Take 1 tablet (600 mg) by mouth every 6 hours as needed for pain (mild)             insulin aspart (NOVOLOG FLEXPEN) 100 UNIT/ML injection  Inject Subcutaneous 3 times daily (with meals) Inject 1 unit per carb choice plus sliding scale             insulin degludec (TRESIBA FLEXTOUCH) 200 UNIT/ML pen  Inject Subcutaneous daily Inject 14 units SQ every day             LEVOTHYROXINE SODIUM PO  Take 137 mcg by mouth At Bedtime              LOSARTAN POTASSIUM PO               METFORMIN HCL PO  Take 500 mg by mouth 2 times daily (with meals) Take 2 tablets orally 2 times a day             Multiple Vitamins-Minerals (MULTIVITAMIN ADULT PO)               OMEPRAZOLE PO  Take 20 mg by mouth every morning             ondansetron (ZOFRAN-ODT) 4 MG ODT tab  Take 1-2 tablets (4-8 mg) by mouth every 8 hours as needed for nausea Dissolve ON the tongue.             oxyCODONE IR (ROXICODONE) 5 MG tablet  Take 1-2 tablets (5-10 mg) by mouth every 4 hours as needed for pain or other (Moderate to Severe)             psyllium (METAMUCIL/KONSYL) Packet  Take 1 packet by mouth daily             senna-docusate (SENOKOT-S;PERICOLACE) 8.6-50 MG per tablet  Take 1-2 tablets by mouth 2 times daily Take while on oral narcotics to prevent or treat constipation.             SERTRALINE HCL PO  Take 100 mg by mouth daily              SIMVASTATIN PO  Take 10 mg by mouth              SITagliptin Phosphate (JANUVIA PO)  Take 100 mg by  mouth daily                 Consultations:  None    Brief History of Illness:  Patient began having PMB. US revealed 9x4x5 cm uterus with EMS 19 mm. Endometrial biopsy showed grade 1 endometrial adenocarcinoma. Subsequently she was referred to the gyn onc clinic and recommended surgery.    Hospital Course:  Dz:   - Preoperative diagnosis was grade 1 endometrial adenocarcinoma. Final pathology is pending at the time of discharge.  She will follow-up postoperatively for a care plan and review of final pathology.  FEN:   - She was maintained on IVF until she was tolerating PO intake.  By discharge, she was tolerating a regular diet without nausea and vomiting and able to maintain her hydration without IVF supplementation.  Pain:   - Her pain was initially controlled on PRN IV pain medication. Once tolerating PO intake she was transitioned to a PO pain regimen.  Her pain was well controlled on this and she was discharged home with these medications.  CV:   - Hx of DM II. Continued on home zocor and cozaar.  Her vital signs were stable while in house and she had no acute CV issues.  PULM:   - She has no history of pulmonary issues.  She was initially given O2 supplementation in to maintain her O2 sats in the immediate postop period and was transitioned off of this without difficulty.  By discharge, her O2 sats were greater than 94% on RA.  She was encouraged to use her bedside IS while in house.  She had no acute pulmonary issues while in house.  HEME:   - Her preoperative Hgb was 13.7.  Minimal blood loss during surgery EBL 25 ml. She had no other acute heme issues while in house.  GI:   - She was made NPO prior to the procedure.  On POD#0, her diet was advanced slowly as tolerated.  At the time of discharge, she was tolerating a regular diet without nausea and vomiting.  She will be discharged with a bowel regimen to prevent constipation in the postoperative period.  She had no acute GI issues while in house.  :    -   A costello catheter was placed at the time of the surgery and removed at the end of the case.  Prior to discharge, the patient was voiding spontaneously without difficulty.  She had no acute  issues while in house.  ID:   - The patient was AF during her hospitalization.  She received standard preoperative antibiotics without incident.    ENDO:   - The patient was continued on her long acting insulin and medium sliding scale in house for her T2DM; her PO home medications were held.  She was continued on her synthroid for her hypothyroidism.   PSYCH/NEURO:   - no issues  PPX:    - She was given SCDs and IS during her hospital course.  She tolerated these prophylactic interventions without incident.  They were discontinued at the time of her discharge.      Discharge Instructions and Follow up:  Ms. Jasmin Lopez was discharged from the hospital with follow up for     Discharge Diet: Regular  Discharge Activity: Activity as tolerated  Discharge Follow up: 6/21/18 Dr Dawson    Discharge Disposition:  Discharge to home    Discharge Staff: Dr Jaron Mcfarland, CNP  6/1/2018 8:46 AM      Provider Disclosure:   I agree with above History, Review of Systems, Physical exam and Plan. I have reviewed the content of the documentation and have edited it as needed. I have personally performed the services documented here and the documentation accurately represents those services and the decisions I have made.     Electronically signed by:   Manuel Salmeron MD   Gynecologic Oncology   [facility]

## 2018-05-31 NOTE — ANESTHESIA PREPROCEDURE EVALUATION
Anesthesia Evaluation     .             ROS/MED HX    ENT/Pulmonary:       Neurologic:       Cardiovascular:         METS/Exercise Tolerance:     Hematologic:         Musculoskeletal:         GI/Hepatic:     (+) cholecystitis/cholelithiasis,       Renal/Genitourinary:         Endo:     (+) type I DM, thyroid problem hypothyroidism, .      Psychiatric:     (+) psychiatric history depression      Infectious Disease:         Malignancy:   (+) Malignancy History of Other  Other CA Endometrial cancer (H) status post         Other:                                    Anesthesia Plan      History & Physical Review  History and physical reviewed and following examination; no interval change.    ASA Status:  3 .        Plan for General and ETT with Intravenous and Propofol induction. Maintenance will be Balanced.    PONV prophylaxis:  Ondansetron (or other 5HT-3) and Dexamethasone or Solumedrol  Additional equipment: 2nd IV      Postoperative Care  Postoperative pain management:  IV analgesics and Multi-modal analgesia.      Consents  Anesthetic plan, risks, benefits and alternatives discussed with:  Patient.  Use of blood products discussed: Yes.   Use of blood products discussed with Patient.  Consented to blood products.  .                          .

## 2018-05-31 NOTE — PROGRESS NOTES
GYN ONC PROGRESS NOTE    Date: 5/31/2018   POD#:  0   Disease: Endometrial adenocarcinoma    24 hour events: Laparoscopic Hysterectomy Bilateral Salpingo Oophorectomy, Cystoscopy, mini lap, oversew of transverse colon     Subjective: Patient states she is doing well.  Pain is minimal; current 2/10. No CP or SOB.  No flatus or spontaneous void.  Has not yet ambulated but sitting up went well.  No N/V, wanting to eat dinner.     Objective:   Vitals:    05/31/18 1630 05/31/18 1645 05/31/18 1654 05/31/18 1709   BP: 120/72 112/69     BP Location:    Left arm   Pulse:       Resp: 12 14  12   Temp:    97.8  F (36.6  C)   TempSrc:    Oral   SpO2: 97% 97% 97%    Weight:       Height:            I/O this shift:  In: 600 [I.V.:600]  Out: 25 [Blood:25]     I/O last 3 completed shifts:  In: 600 [I.V.:600]  Out: 200 [Urine:200]       Vitals:    05/31/18 0940   Weight: 72.3 kg (159 lb 6.3 oz)        Exam:   Gen- Pleasant, NAD  CV- Regular rate and rhythm without appreciable murmurs.    Pulm- Non-labored breathing.  Appropriate inspiratory effort. Lung sounds are clear to auscultation bilaterally.    Abdo- + BS. Soft, appropriately tender and non distended.  No rebound or guarding.   Incision- Mini vertical midline incision and port sites with overlying dermabond are C/D/I  Extr- No edema, SCDs on    Lines/drains- PIVx    New Labs/Imaging- None    Pending cultures (date obtained): None    Assessment/Plan: Jasmin Lopez is a 62 year old woman with a recent diagnosis of grade 1 endometrial adenocarcinoma following EBx for PMB; she is now POD#0 s/p TLH, BSO and cystoscopy c/b incidental enterotomy with Veress needle requiring mini-lap and oversewing of puncture site on the transverse colon. Doing well immediately post-op.     Active Problem list:    1. Post-op state  2. HTN  3. Hypothyroidism   4. T2DM  5. GERD  6. Allergies     Dz: Endometrial adenocarcinoma on frozen, final pathology pending    FEN: Regular, LR @ 100cc/hr for 8 hours   Pain: Schedule tylenol, Toradol>Motrin, PRN Katheryn and IV dilaudid for breakthrough   Heme: Hgb 13.7> EBL 25cc. VSS and asymptomatic.   CV: HTN; holding Losartan.   Pulm: NI; encourage IS  GI: GERD; Prilosec.  Scheduled bowel regimen and simethicone. PRN anti-emetics.   : Velásquez out at awaiting spontaneous void   ID: S/p perioperative Ancef and Flagyl; Afebrile   Endo: Hypothyroidism; Synthroid.  T2DM; hold PTA Metformin and Junuvia.  Continue with 1unit:1CHO, and medium SSI.   Psych/Neuro: NI  PPX: SCDs while in bed, encourage ambulation, IS while awake  Dispo: Home when meeting goals; likely on POD#1      Etta Sagastume MD   OB/Gyn Resident, PGY-4  5/31/2018   5:48 PM   Pager: [phone]

## 2018-05-31 NOTE — OP NOTE
Gynecologic Oncology Operative Report    5/31/2018  Jasmin Lopez  [id number]    PREOPERATIVE DIAGNOSIS: Grade 1 endometrial adenocarcinoma    POSTOPERATIVE DIAGNOSIS: grade 1 endometrioid adenocarcinoma with 1.9 cm tumor and no myometrial invasion on frozen section, final pathology pending    PROCEDURES: Total laparoscopic hysterectomy, bilateral salpingo-oophorectomy, mini-laparotomy with oversewing of transverse colon, cystoscopy    SURGEON: Christine Dawson MD     ASSISTANTS:  Etta Sagastume MD, PGY-4.     ANESTHESIA: General endotracheal     ESTIMATED BLOOD LOSS: 25 cc     IV FLUIDS: 1000 cc crystalloid    URINE OUTPUT: 200 cc clear urine     INDICATIONS: Jasmin Lopez is a 62 year old who presented with PMB.  She underwent an US on 4/18/18 that showed a normal sized uterus with a 19 mm EMS.  An EMB was then performed which showed a grade 1 endometrial adenocarcinoma.  Following a thorough discussion of the risks, benefits, indications and alternatives she consented to the above procedure.    FINDINGS: On EUA the patient had normal external genitalia and a normal sized, mobile uterus.  On laparoscopy there was a grossly normal uterus and bilateral adnexa.  There was noted to be placement of the Veress needle in the colon with marked distention of the colon upon entry to the abdomen and the small colon defect was visible.  This loop of bowel was adherent to the anterior abdominal wall but there were no other adhesions.  The remainder of the abdominal and pelvic surveys was unremarkable.  Frozen section results showed grade 1 endometrioid adenocarcinoma with 1.9 cm tumor and no myometrial invasion on frozen section.  On cystoscopy there was no evidence of bladder injury or foreign body and there was brisk efflux from the bilateral ureteral orifices.    SPECIMENS:   1.  Pelvic washings  2.  Uterus, cervix, bilateral ovaries and fallopian tubes    COMPLICATIONS: Veress needle injury to the transverse  colon    CONDITION: Stable to PACU.     PROCEDURE:   Consent was reviewed with the patient in the preoperative setting and confirmed. She received prophylactic antibiotics. In addition, she received heparin for venous thrombosis prevention. The patient was transferred to the operating room and placed in dorsal supine position. General anesthetic was obtained in the usual manner without noted difficulties. The patient was then positioned onto John stirrups and an exam under anesthesia was performed with findings as described above.  The patient was prepped and draped for the above-mentioned procedure and Velásquez catheter was then placed under sterile techniques.  Timeout was called at which point the patient's name, procedure and operative site was confirmed by the operative team. Initially, the instruments for the vaginal manipulator were positioned, a speculum was placed in the vagina. The anterior lip of the cervix was grasped, the uterus sounded to 8 cm and cervix was serially dilated. The [employer] vaginal manipulator was then inserted and the vaginal balloon was then inserted to obtain intraabdominal pressure.     Attention was then turned to the upper abdomen. Initially, an incision was made at the umbilicus and the Veress needle introduced through this stab incision. The abdomen was insufflated with an opening pressure of 1 mmHg. The Veress needle was removed. It was noted that the pressure chiki quite quickly with placement of the Veress needle. The initial midline 5 mm port was inserted without difficulties and initial survey revealed no damage to underlying structures, however there was noted to be marked distention of the entire colon.  The left lateral 12 mm and right lateral 5 mm ports were then placed under direct visualization without any injury noted to underlying structures. We then placed the camera in the lateral port and noted that there was a small pinpoint defect at the transverse colon where it was  adherent to the anterior abdominal wall just superior and lateral to the midline port.  We consulted with Dr Monroy of colorectal surgery, who agreed that this defect was small enough to oversew at the completion of the procedure and thus we proceeded with the case.  At this point, the patient was placed into steep Trendelenburg. The pelvis was inspected as well as the upper abdomen with findings as noted above. Pelvic washings were obtained and sent for cytology. Bowels were packed up into the upper abdomen with gentle traction.     Attention was then turned to the pelvis. The round ligaments were identified bilaterally. They were divided using cautery and the retroperitoneal space was entered by dissecting the peritoneum lateral and cephalad to the IP ligaments. The ureters were identified and a defect was made underneath the IP ligament and above the ureter. The IP ligament was serially cauterized and then divided sharply. The rest of the peritoneum was skeletonized down to the level of the uterine arteries which were then cauterized and divided.  The bladder flap was created using cautery down to just below the level of the uterine manipulator cup. The rest of the parametrial tissue was dissected off of the uterus serially cauterized and divided sharply. A colpotomy was made on the anterior side and carried around to the posterior side of the uterine manipulator cup using the electrocautery. The uterus was removed through the vagina and sent for frozen section pathology which revealed grade 1 endometrioid adenocarcinoma with 1.9 cm tumor and no myometrial invasion on frozen section,.      The vaginal cuff was then closed using a V-Lock suture and the EndoStitch device with excellent reapproximation and good hemostasis.   Next, we performed cystoscopy using 30-degree angled scope and noted normal bladder mucosa, no evidence of foreign body and brisk efflux from the bilateral ureteral orifices. At this point, the  vaginal balloon was removed from the vagina. We closed the fascia on the 12 mm incision using the Schuyler-Madrigal device. All laparoscopic instruments and ports were now removed and CO2 allowed to escape from the abdomen. We then performed a small 4-5 cm incision in the midline just lateral on the left of the umbilicus.  The incision was performed sharply and carried through the subcutaneous skin with cautery.  The fascia was entered and the peritoneum was entered sharply.  We then took down the adhesions from the transverse colon to the anterior abdominal wall and exteriorized the transverse colon.  The small pinpoint defect was easily identifiable and this area was oversewn with silk sutures.  There was another area where the mucosa was somewhat denuded and this area was also oversewn with silk sutures.  The bowel was run for several inches in both directions without any other defects noted.  The bowel was then placed back into the abdomen.  The fascia was closed with an 0-looped PDS suture.  The subcutaneous skin was irrigated.  Skin at the port sites and mini-laparotomy was reapproximated with 4-0 Vicryl sutures and Dermabond applied. The patient tolerated the procedure well and was taken to the recovery room in stable condition.    Sponge, lap and needle counts were reported as correct x2 and instrument count was correct x1.     Christine Dawson MD  Gynecologic Oncology  [facility]

## 2018-05-31 NOTE — IP AVS SNAPSHOT
Unit 6D Observation [address] [address]    Phone:  [phone]    Fax:  [phone]                                       After Visit Summary   5/31/2018    Jasmin Lopez    MRN: [id number]           After Visit Summary Signature Page     I have received my discharge instructions, and my questions have been answered. I have discussed any challenges I see with this plan with the nurse or doctor.    ..........................................................................................................................................  Patient/Patient Representative Signature      ..........................................................................................................................................  Patient Representative Print Name and Relationship to Patient    ..................................................               ................................................  Date                                            Time    ..........................................................................................................................................  Reviewed by Signature/Title    ...................................................              ..............................................  Date                                                            Time

## 2018-05-31 NOTE — IP AVS SNAPSHOT
MRN:[id number]                      After Visit Summary   5/31/2018    Jasmin Lopez    MRN: [id number]           Thank you!     Thank you for choosing [facility] for your care. Our goal is always to provide you with excellent care. Hearing back from our patients is one way we can continue to improve our services. Please take a few minutes to complete the written survey that you may receive in the mail after you visit with us. Thank you!        Patient Information     Date Of Birth          1955        About your hospital stay     You were admitted on:  May 31, 2018 You last received care in the:  Unit 6D Observation [facility]    You were discharged on:  June 1, 2018       Who to Call     For medical emergencies, please call [phone].  For non-urgent questions about your medical care, please call your primary care provider or clinic, [phone]  For questions related to your surgery, please call your surgery clinic        Attending Provider     Provider Specialty    Christine Olmos MD Gyn-Onc       Primary Care Provider Office Phone # Fax #    Mayi Young [phone]      After Care Instructions     Diet Instructions       Resume pre-procedure diet            Discharge Instructions       Patient to follow up with Dr. Dawson on 6/21/18. Please call prior to then for questions or concerns [phone]    GENERAL POST-OPERATIVE  PATIENT INSTRUCTIONS      FOLLOW-UP:    Call Surgeon if you have:  Temperature greater than 100.4  Persistent nausea and vomiting  Severe uncontrolled pain  Redness, tenderness, or signs of infection (pain, swelling, redness, odor or green/yellow discharge around the site)  Difficulty breathing, headache or visual disturbances  Hives  Persistent dizziness or light-headedness  Extreme fatigue  Any other questions or concerns you may have after discharge    In an emergency, call 911 or go to an Emergency Department at a nearby hospital        WOUND CARE INSTRUCTIONS:  Keep a dry clean dressing on the wound if there is drainage. If you had a bandage initially, it may be removed after 24 hours.  Once the wound has quit draining you may leave it open to air.  If clothing rubs against the wound or causes irritation and the wound is not draining you may cover it with a dry dressing during the daytime.  Try to keep the wound dry and avoid ointments on the wound unless directed to do so.  If the wound becomes bright red and painful or starts to drain infected material that is not clear, please contact your physician immediately.    1.  You may shower 24 hrs after surgery   2.  No soaking in the tub for 4 weeks       DIET:  There are no dietary restrictions.  You may eat any foods that you can tolerate unless instructed otherwise.  It is a good idea to eat a high fiber diet and take in plenty of fluids to prevent constipation.  If you become constipated, please follow the instructions below.    ACTIVITY:  You are encouraged to cough, deep breath and use your incentive spirometer if you were given one, every 15-30 minutes when awake.  This will help prevent respiratory complications and low grade fevers post-operatively.  You may want to hug a pillow when coughing and sneezing to add additional support to the surgical area, if you had abdominal surgery, which will decrease pain during these times.      1.  No heavy lifting >20lbs or strenuous exercise for six-eight weeks.  No exercise in which you are using core muscles (yoga, pilates, swimming, weight lifting)  2.  You may walk as much as you wish.  You are encouraged to increase your activity each day after surgery.  Stairs are okay.   3.  Nothing per vagina for eight weeks.  No tampons, no intercourse, no douching.  You can expect some light vaginal spotting and discharge for up to six weeks.  If bleeding becomes heavy, please contact the office.     MEDICATIONS:  Try to take narcotic medications and  anti-inflammatory medications, such as tylenol, ibuprofen, naprosyn, etc., with food.  This will minimize stomach upset from the medication.  Should you develop nausea and vomiting from the pain medication, or develop a rash, please discontinue the medication and contact your physician.  You should not drive, make important decisions, or operate machinery when taking narcotic pain medication.    OTHER:  Patients are often constipated after general anesthesia and surgery.  The patient should continue to take stool softeners (for example, Senokot-S) for the next six weeks (unless diarrhea develops) and consume adequate amounts of water.  If the patient remains constipated or unable to pass stool, please try one or all of the following measures:  1.  Milk of Magnesia 30cc twice a day as needed by mouth  2.  Metamucil 2 tablespoons in 12 ounces of fluid  3.  Dulcolax oral or suppositories  4.  Prunes or prune juice  5.  Miralax daily      QUESTIONS:  Please feel free to call your physician or the hospital  if you have any questions, and they will be glad to assist you.            Ice to affected area       Ice to operative site PRN            No Alcohol       For 24 hours post procedure            No driving or operating machinery        until the day after procedure            No lifting        No lifting over 15 lbs and no strenuous physical activity for 6 weeks            Shower       No shower for 24 hours post procedure. May shower Postoperative Day (POD)  1            Weight bearing status - As tolerated                 Your next 10 appointments already scheduled     Jun 21, 2018 12:30 PM CDT   Post Op with Christine Galindo MD   [facility]    [address] [place] [id number] [phone]              Additional Information     If you use hormonal birth control (such as the pill, patch, ring or implants): You'll need a second form of  "birth control for 7 days (condoms, a diaphragm or contraceptive foam). While in the hospital, you received a medicine called Bridion. Your normal birth control will not work as well for a week after taking this medicine.          Pending Results     Date and Time Order Name Status Description    5/31/2018 1406 Surgical pathology exam Preliminary     5/31/2018 1347 Cytology non gyn In process             Statement of Approval     Ordered          06/01/18 1008  I have reviewed and agree with all the recommendations and orders detailed in this document.  EFFECTIVE NOW     Approved and electronically signed by:  Chyna Mcfarland APRN CNP             Admission Information     Date & Time Provider Department Dept. Phone    5/31/2018 Christine Olmos MD Unit 6D Observation [facility] [phone]      Your Vitals Were     Blood Pressure Pulse Temperature Respirations Height Weight    127/66 (BP Location: Right arm) 94 97.9  F (36.6  C) (Oral) 14 1.537 m (5' 0.5\") 72.3 kg (159 lb 6.3 oz)    Pulse Oximetry BMI (Body Mass Index)                95% 30.62 kg/m2          Care EveryWhere ID     This is your Care EveryWhere ID. This could be used by other organizations to access your [place] medical records  [id number]        Equal Access to Services     NAIMA GILL AH: Hadgarland hurtadoo Sobrigid, waaxda luqadaha, qaybta kaalmada adeegyada, suzanne quintana. So [facility] [phone].    ATENCIÓN: Si habla español, tiene a smith disposición servicios gratuitos de asistencia lingüística. Llame al [phone].    We comply with applicable federal civil rights laws and [place] laws. We do not discriminate on the basis of race, color, national origin, age, disability, sex, sexual orientation, or gender identity.               Review of your medicines      START taking        Dose / Directions    acetaminophen 325 MG tablet   Commonly known as:  TYLENOL        Dose:  650 mg   Take 2 tablets (650 " mg) by mouth every 4 hours as needed for other (mild pain)   Quantity:  50 tablet   Refills:  0       ibuprofen 600 MG tablet   Commonly known as:  ADVIL/MOTRIN        Dose:  600 mg   Take 1 tablet (600 mg) by mouth every 6 hours as needed for pain (mild)   Quantity:  30 tablet   Refills:  0       ondansetron 4 MG ODT tab   Commonly known as:  ZOFRAN-ODT        Dose:  4-8 mg   Take 1-2 tablets (4-8 mg) by mouth every 8 hours as needed for nausea Dissolve ON the tongue.   Quantity:  4 tablet   Refills:  0       oxyCODONE IR 5 MG tablet   Commonly known as:  ROXICODONE        Dose:  5-10 mg   Take 1-2 tablets (5-10 mg) by mouth every 4 hours as needed for pain or other (Moderate to Severe)   Quantity:  18 tablet   Refills:  0       senna-docusate 8.6-50 MG per tablet   Commonly known as:  SENOKOT-S;PERICOLACE        Dose:  1-2 tablet   Take 1-2 tablets by mouth 2 times daily Take while on oral narcotics to prevent or treat constipation.   Quantity:  30 tablet   Refills:  0         CONTINUE these medicines which have NOT CHANGED        Dose / Directions    aspirin 81 MG tablet        Take by mouth daily   Refills:  0       CALCIUM + D PO        Refills:  0       cetirizine 10 MG tablet   Commonly known as:  zyrTEC        Dose:  10 mg   Take 10 mg by mouth daily At night   Refills:  0       JANUVIA PO        Dose:  100 mg   Take 100 mg by mouth daily   Refills:  0       LEVOTHYROXINE SODIUM PO        Dose:  137 mcg   Take 137 mcg by mouth At Bedtime   Refills:  0       LOSARTAN POTASSIUM PO        Refills:  0       METFORMIN HCL PO        Dose:  500 mg   Take 500 mg by mouth 2 times daily (with meals) Take 2 tablets orally 2 times a day   Refills:  0       MULTIVITAMIN ADULT PO        Refills:  0       NovoLOG FLEXPEN 100 UNIT/ML injection   Generic drug:  insulin aspart        Inject Subcutaneous 3 times daily (with meals) Inject 1 unit per carb choice plus sliding scale   Refills:  0       OMEPRAZOLE PO        Dose:   20 mg   Take 20 mg by mouth every morning   Refills:  0       psyllium Packet   Commonly known as:  METAMUCIL/KONSYL        Dose:  1 packet   Take 1 packet by mouth daily   Refills:  0       SERTRALINE HCL PO        Dose:  100 mg   Take 100 mg by mouth daily   Refills:  0       SIMVASTATIN PO        Dose:  10 mg   Take 10 mg by mouth   Refills:  0       TRESIBA FLEXTOUCH 200 UNIT/ML pen   Generic drug:  insulin degludec        Inject Subcutaneous daily Inject 14 units SQ every day   Refills:  0            Where to get your medicines      These medications were sent to [facility] [place], [place] - [address] [facility] [facility]  [address] [facility] [address]     Phone:  [phone]     acetaminophen 325 MG tablet    ibuprofen 600 MG tablet    ondansetron 4 MG ODT tab    senna-docusate 8.6-50 MG per tablet         Some of these will need a paper prescription and others can be bought over the counter. Ask your nurse if you have questions.     Bring a paper prescription for each of these medications     oxyCODONE IR 5 MG tablet                Protect others around you: Learn how to safely use, store and throw away your medicines at www.disposemymeds.org.        Information about OPIOIDS     PRESCRIPTION OPIOIDS: WHAT YOU NEED TO KNOW   You have a prescription for an opioid (narcotic) pain medicine. Opioids can cause addiction. If you have a history of chemical dependency of any type, you are at a higher risk of becoming addicted to opioids. Only take this medicine after all other options have been tried. Take it for as short a time and as few doses as possible.     Do not:    Drive. If you drive while taking these medicines, you could be arrested for driving under the influence (DUI).    Operate heavy machinery    Do any other dangerous activities while taking these medicines.     Drink any alcohol while taking these medicines.      Take with any other medicines that contain acetaminophen. Read all  labels carefully. Look for the word  acetaminophen  or  Tylenol.  Ask your pharmacist if you have questions or are unsure.    Store your pills in a secure place, locked if possible. We will not replace any lost or stolen medicine. If you don t finish your medicine, please throw away (dispose) as directed by your pharmacist. The [place] Pollution Control Agency has more information about safe disposal: https://www.pca.[facility].[place].us/living-green/managing-unwanted-medications    All opioids tend to cause constipation. Drink plenty of water and eat foods that have a lot of fiber, such as fruits, vegetables, prune juice, apple juice and high-fiber cereal. Take a laxative (Miralax, milk of magnesia, Colace, Senna) if you don t move your bowels at least every other day.              Medication List: This is a list of all your medications and when to take them. Check marks below indicate your daily home schedule. Keep this list as a reference.      Medications           Morning Afternoon Evening Bedtime As Needed    acetaminophen 325 MG tablet   Commonly known as:  TYLENOL   Take 2 tablets (650 mg) by mouth every 4 hours as needed for other (mild pain)   Last time this was given:  650 mg on 6/1/2018  1:02 AM                                aspirin 81 MG tablet   Take by mouth daily                                CALCIUM + D PO                                cetirizine 10 MG tablet   Commonly known as:  zyrTEC   Take 10 mg by mouth daily At night   Last time this was given:  10 mg on 6/1/2018  7:46 AM                                ibuprofen 600 MG tablet   Commonly known as:  ADVIL/MOTRIN   Take 1 tablet (600 mg) by mouth every 6 hours as needed for pain (mild)                                JANUVIA PO   Take 100 mg by mouth daily                                LEVOTHYROXINE SODIUM PO   Take 137 mcg by mouth At Bedtime   Last time this was given:  137 mcg on 5/31/2018 10:29 PM                                LOSARTAN  POTASSIUM PO                                METFORMIN HCL PO   Take 500 mg by mouth 2 times daily (with meals) Take 2 tablets orally 2 times a day                                MULTIVITAMIN ADULT PO                                NovoLOG FLEXPEN 100 UNIT/ML injection   Inject Subcutaneous 3 times daily (with meals) Inject 1 unit per carb choice plus sliding scale   Last time this was given:  5 Units on 6/1/2018  7:43 AM   Generic drug:  insulin aspart                                OMEPRAZOLE PO   Take 20 mg by mouth every morning   Last time this was given:  20 mg on 6/1/2018  7:46 AM                                ondansetron 4 MG ODT tab   Commonly known as:  ZOFRAN-ODT   Take 1-2 tablets (4-8 mg) by mouth every 8 hours as needed for nausea Dissolve ON the tongue.                                oxyCODONE IR 5 MG tablet   Commonly known as:  ROXICODONE   Take 1-2 tablets (5-10 mg) by mouth every 4 hours as needed for pain or other (Moderate to Severe)   Last time this was given:  5 mg on 5/31/2018  6:56 PM                                psyllium Packet   Commonly known as:  METAMUCIL/KONSYL   Take 1 packet by mouth daily                                senna-docusate 8.6-50 MG per tablet   Commonly known as:  SENOKOT-S;PERICOLACE   Take 1-2 tablets by mouth 2 times daily Take while on oral narcotics to prevent or treat constipation.   Last time this was given:  1 tablet on 6/1/2018  7:46 AM                                SERTRALINE HCL PO   Take 100 mg by mouth daily   Last time this was given:  100 mg on 6/1/2018  7:46 AM                                SIMVASTATIN PO   Take 10 mg by mouth   Last time this was given:  10 mg on 5/31/2018 10:28 PM                                TRESIBA FLEXTOUCH 200 UNIT/ML pen   Inject Subcutaneous daily Inject 14 units SQ every day   Last time this was given:  14 Units on 5/31/2018 10:28 PM   Generic drug:  insulin degludec

## 2018-05-31 NOTE — ANESTHESIA POSTPROCEDURE EVALUATION
Patient: Jasmin Lopez    Procedure(s):  Laparoscopic Hysterectomy Bilateral Salpingo Oophorectomy, Cystoscopy, mini laparotomy, oversew of transverse colon - Wound Class: II-Clean Contaminated   - Wound Class: II-Clean Contaminated    Diagnosis:Endometrial Cancer   Diagnosis Additional Information: No value filed.    Anesthesia Type:  General, ETT    Note:  Anesthesia Post Evaluation    Patient location during evaluation: PACU  Patient participation: Able to fully participate in evaluation  Level of consciousness: awake and alert  Pain management: adequate  Airway patency: patent  Cardiovascular status: acceptable  Respiratory status: acceptable  Hydration status: acceptable  PONV: none     Anesthetic complications: None          Last vitals:  Vitals:    05/31/18 0940 05/31/18 1200 05/31/18 1530   BP: 133/84     Pulse: 87     Resp: 14     Temp: 37.5  C (99.5  F) 37.2  C (98.9  F) 37  C (98.6  F)   SpO2: 96%           Electronically Signed By: Young Hobbs MD  May 31, 2018  3:57 PM

## 2018-05-31 NOTE — BRIEF OP NOTE
[facility], [place]    Brief Operative Note    Pre-operative diagnosis: Endometrial Cancer   Post-operative diagnosis Same,s/p below stated procedure(s)    Procedure: Procedure(s):  Laparoscopic Hysterectomy Bilateral Salpingo Oophorectomy, Cystoscopy, mini lap, oversew of transverse colon - Wound Class: II-Clean Contaminated   - Wound Class: II-Clean Contaminated  Surgeon: Surgeon(s) and Role:     * Christine Olmos MD - Primary     * Etta Palomares MD - Resident - Assisting     * DeeD ee Neff MD - Resident - Assisting  Anesthesia: General   EBL: 25cc  IVF: 1000cc  UOP: 200cc  Drains: None  Specimens:   ID Type Source Tests Collected by Time Destination   1 : Pelvic Washings Washings Pelvis CYTOLOGY NON GYN Christine Olmos MD 5/31/2018  1:47 PM    A : Uterus, Cervix, Bilateral Fallopian Tubes and Ovaries, Uterus for Frozen Tissue Uterus with Bilateral Ovaries and Fallopian Tubes SURGICAL PATHOLOGY EXAM Christine Olmos MD 5/31/2018  2:05 PM      Complications: Incidental enterotomy with Verese needle requiring mini lap and oversewing of transverse colon.  Implants: None.      Etta Sagastume MD   OB/Gyn Resident, PGY-4  May 31, 2018, 3:16 PM

## 2018-05-31 NOTE — OR NURSING
No new labs needed pre-op.  Also, verbal order for tylenol, gabapentin, and IV insulin for BG of 237.

## 2018-05-31 NOTE — ANESTHESIA CARE TRANSFER NOTE
Patient: Jasmin Lopez    Procedure(s):  Laparoscopic Hysterectomy Bilateral Salpingo Oophorectomy, Cystoscopy, mini lap, oversew of transverse colon - Wound Class: II-Clean Contaminated   - Wound Class: II-Clean Contaminated    Diagnosis: Endometrial Cancer   Diagnosis Additional Information: No value filed.    Anesthesia Type:   General, ETT     Note:  Airway :Nasal Cannula  Patient transferred to:PACU  Handoff Report: Identifed the Patient, Identified the Reponsible Provider, Reviewed the pertinent medical history, Discussed the surgical course, Reviewed Intra-OP anesthesia mangement and issues during anesthesia, Set expectations for post-procedure period and Allowed opportunity for questions and acknowledgement of understanding      Vitals: (Last set prior to Anesthesia Care Transfer)    CRNA VITALS  5/31/2018 1445 - 5/31/2018 1520      5/31/2018             Pulse: 97    SpO2: 97 %    Resp Rate (observed): 14    EKG: Sinus rhythm                Electronically Signed By: SHERRI Tam CRNA  May 31, 2018  3:20 PM

## 2018-06-01 VITALS
HEART RATE: 94 BPM | BODY MASS INDEX: 30.09 KG/M2 | SYSTOLIC BLOOD PRESSURE: 127 MMHG | RESPIRATION RATE: 14 BRPM | WEIGHT: 159.39 LBS | DIASTOLIC BLOOD PRESSURE: 66 MMHG | TEMPERATURE: 97.9 F | HEIGHT: 61 IN | OXYGEN SATURATION: 95 %

## 2018-06-01 PROBLEM — C54.1 ENDOMETRIAL CANCER (H): Status: ACTIVE | Noted: 2018-06-01

## 2018-06-01 LAB
GLUCOSE BLDC GLUCOMTR-MCNC: 267 MG/DL (ref 70–99)
GLUCOSE BLDC GLUCOMTR-MCNC: 318 MG/DL (ref 70–99)

## 2018-06-01 PROCEDURE — 25000132 ZZH RX MED GY IP 250 OP 250 PS 637: Performed by: OBSTETRICS & GYNECOLOGY

## 2018-06-01 PROCEDURE — 25000128 H RX IP 250 OP 636: Performed by: OBSTETRICS & GYNECOLOGY

## 2018-06-01 PROCEDURE — 99024 POSTOP FOLLOW-UP VISIT: CPT | Mod: GC | Performed by: OBSTETRICS & GYNECOLOGY

## 2018-06-01 PROCEDURE — G0378 HOSPITAL OBSERVATION PER HR: HCPCS

## 2018-06-01 PROCEDURE — 12000001 ZZH R&B MED SURG/OB UMMC

## 2018-06-01 PROCEDURE — 96372 THER/PROPH/DIAG INJ SC/IM: CPT

## 2018-06-01 PROCEDURE — 00000146 ZZHCL STATISTIC GLUCOSE BY METER IP

## 2018-06-01 RX ADMIN — SENNOSIDES AND DOCUSATE SODIUM 1 TABLET: 8.6; 5 TABLET ORAL at 07:46

## 2018-06-01 RX ADMIN — OMEPRAZOLE 20 MG: 20 CAPSULE, DELAYED RELEASE ORAL at 07:46

## 2018-06-01 RX ADMIN — KETOROLAC TROMETHAMINE 30 MG: 30 INJECTION, SOLUTION INTRAMUSCULAR at 06:09

## 2018-06-01 RX ADMIN — SIMETHICONE CHEW TAB 80 MG 80 MG: 80 TABLET ORAL at 07:46

## 2018-06-01 RX ADMIN — SERTRALINE HYDROCHLORIDE 100 MG: 50 TABLET ORAL at 07:46

## 2018-06-01 RX ADMIN — ACETAMINOPHEN 650 MG: 325 TABLET, FILM COATED ORAL at 01:02

## 2018-06-01 RX ADMIN — CETIRIZINE HYDROCHLORIDE 10 MG: 10 TABLET, FILM COATED ORAL at 07:46

## 2018-06-01 ASSESSMENT — PAIN DESCRIPTION - DESCRIPTORS: DESCRIPTORS: ACHING;DISCOMFORT

## 2018-06-01 NOTE — PLAN OF CARE
Problem: Patient Care Overview  Goal: Discharge Needs Assessment  Outpatient/Observation goals to be met before discharge home:    ~ Patient able to ambulate as they were prior to admission or with assist devices provided by therapies during their stay-Yes

## 2018-06-01 NOTE — PROGRESS NOTES
Gynecologic Oncology   Progress Note      POD#1:TLH, BSO, cystoscopy c/b incidental enterotomy with Veress needle requiring mini-lap and oversewing of puncture site on transverse colon  Disease: Endometrial adenocarcinoma on frozen    24 hour events:   - Velásquez out, voiding spontaneously  - On 2L oxygen by [place] overnight with normal O2 sats this AM  - Hyperglycemia requiring SSI    Subjective: Patient states she is doing well today.  Denies any fevers, chills, SOB or CP. Pain is controlled. Passing scant flatus, no BM as of yet. Voiding spontaneously. Scant bleeding. Tolerating regular diet without N/V.  Feels ready to be DC home.    Objective:   Vitals:    05/31/18 1900 05/31/18 2000 05/31/18 2211 06/01/18 0605   BP: 125/71 132/84 122/72 127/66   BP Location:   Right arm Right arm   Pulse:  94     Resp:  12 13 14   Temp:  98.4  F (36.9  C) 98.8  F (37.1  C) 97.9  F (36.6  C)   TempSrc:  Oral Oral Oral   SpO2:  95% 91% 95%   Weight:       Height:           GEN - NAD, lying comfortably in bed  CV - RRR  PULM - CTAB, no wheezing  ABD - soft, non distended, appropriately tender to palpation  INC - Mni vertical midline incision and port sites with overlying dermabond C/D/I  Ext - warm and well perfused, no edema, non-tender, SCDs on    Lines/drains:   PIV    I/Os  (Yesterday // Since Midnight)  PO: 0cc // 0cc (patient drinking just not recorded)  IVF: 1200cc // 0cc  Urine: 500cc // 0cc (patient voiding just not recorded)       New Labs/Imaging-   Results for orders placed or performed during the hospital encounter of 05/31/18 (from the past 24 hour(s))   Glucose by meter   Result Value Ref Range    Glucose 237 (H) 70 - 99 mg/dL   Glucose by meter   Result Value Ref Range    Glucose 120 (H) 70 - 99 mg/dL   Glucose by meter   Result Value Ref Range    Glucose 128 (H) 70 - 99 mg/dL   Glucose by meter   Result Value Ref Range    Glucose 160 (H) 70 - 99 mg/dL   Glucose by meter   Result Value Ref Range    Glucose 199 (H) 70 -  99 mg/dL   Glucose by meter   Result Value Ref Range    Glucose 301 (H) 70 - 99 mg/dL   Glucose by meter   Result Value Ref Range    Glucose 267 (H) 70 - 99 mg/dL     Assessment:  Jasmin Lopez is a 62 year old woman with a recent diagnosis of grade 1 endometrial adenocarcinoma following EBx for PMB; she is now POD#1 s/p TLH, BSO and cystoscopy c/b incidental enterotomy with Veress needle requiring mini-lap and oversewing of puncture site on the transverse colon. Doing well post-op.      Active Problem list:    1. Post-op state  2. HTN  3. Hypothyroidism   4. T2DM  5. GERD  6. Allergies      Dz: Endometrial adenocarcinoma on frozen, final pathology pending   FEN: Regular, s/p IV fluids  Pain: Schedule tylenol, Toradol>Motrin, PRN Katheryn and IV dilaudid for breakthrough   Heme: Hgb 13.7> EBL 25cc. VSS and asymptomatic.   CV: HTN; holding Losartan.   Pulm: NI; encourage IS  GI: GERD; Prilosec.  Scheduled bowel regimen and simethicone. PRN anti-emetics.   : S/p costello. Voiding spontaneously   ID: S/p perioperative Ancef and Flagyl; Afebrile   Endo: Hypothyroidism; Synthroid.  T2DM; hold PTA Metformin and Junuvia.  Continue with 1unit:1CHO, and medium SSI.   Psych/Neuro: NI  PPX: SCDs while in bed, encourage ambulation, IS while awake  Dispo: Home later today     # Pain Assessment:  Current Pain Score 5/31/2018   Patient currently in pain? sleeping: patient not able to self report   Pain location -   Pain descriptors -   - Jasmin is experiencing pain due to recent surgery. Pain management was discussed and the plan was created in a collaborative fashion.  Jasmin's response to the current recommendations: engaged  - Please see the plan for pain management as documented above    Etta Sagastume MD   OB/Gyn Resident, PGY-4  June 1, 2018, 7:04 AM   [phone]    Provider Disclosure:   I agree with above History, Review of Systems, Physical exam and Plan. I have reviewed the content of the documentation and have edited it as  needed. I have personally performed the services documented here and the documentation accurately represents those services and the decisions I have made.     Electronically signed by:   Manuel Salmeron MD   Gynecologic Oncology   [facility]

## 2018-06-01 NOTE — PROGRESS NOTES
"/72 (BP Location: Right arm)  Pulse 94  Temp 98.8  F (37.1  C) (Oral)  Resp 13  Ht 1.537 m (5' 0.5\")  Wt 72.3 kg (159 lb 6.3 oz)  SpO2 91%  BMI 30.62 kg/m2  Total of 3 lap sites CDI with liquid bandage, no redness or draining. Patient received oxycodone x 1 for pain and schedule Tylenol. Blood sugars this evening were 199 and 301, sliding scale Novolog given as ordered. Patient is SBA with tranfers and mobility. She is voiding spontaneously and without difficulty. She is alert and oriented and able to make needs known. Will continue to monitor.  "

## 2018-06-01 NOTE — PROGRESS NOTES
Pt alert and oriented. Vitals WNL. Pt able to ambulate without any issues. Pt had some pain which scheduled pain meds were effective for pt. Pt tolerating regular diet and voiding without any issues. Able to make needs known. Call light within reach. Will continue to monitor.

## 2018-06-01 NOTE — PROGRESS NOTES
Discharge instructions reviewed, understood, and signed by patient. VSS, PIV removed, new medications reviewed and understood, patient has all belongings. Patient awaiting family member

## 2018-06-04 ENCOUNTER — TELEPHONE (OUTPATIENT)
Dept: ONCOLOGY | Facility: CLINIC | Age: 63
End: 2018-06-04

## 2018-06-04 LAB — COPATH REPORT: NORMAL

## 2018-06-04 NOTE — TELEPHONE ENCOUNTER
Called Jasmin to follow up post surgery and overnight hospitalization. Jasmin reports having low level pain that is being relieved by pain medication. She states she is eating and drinking well and that her bowel and bladder are within normal limits. She is balancing her activity and rest adequately per her report. Jasmin states she has the phone numbers for the clinic if any need arises.    Maria Teresa Petty RN

## 2018-06-05 LAB — COPATH REPORT: NORMAL

## 2018-06-21 ENCOUNTER — ONCOLOGY VISIT (OUTPATIENT)
Dept: ONCOLOGY | Facility: CLINIC | Age: 63
End: 2018-06-21
Payer: COMMERCIAL

## 2018-06-21 VITALS
HEIGHT: 61 IN | TEMPERATURE: 98.2 F | HEART RATE: 83 BPM | BODY MASS INDEX: 30.21 KG/M2 | SYSTOLIC BLOOD PRESSURE: 121 MMHG | RESPIRATION RATE: 16 BRPM | OXYGEN SATURATION: 97 % | DIASTOLIC BLOOD PRESSURE: 80 MMHG | WEIGHT: 160 LBS

## 2018-06-21 DIAGNOSIS — Z12.12 SCREENING FOR COLORECTAL CANCER: ICD-10-CM

## 2018-06-21 DIAGNOSIS — C54.1 ENDOMETRIAL CANCER (H): ICD-10-CM

## 2018-06-21 DIAGNOSIS — Z12.31 VISIT FOR SCREENING MAMMOGRAM: Primary | ICD-10-CM

## 2018-06-21 DIAGNOSIS — Z12.11 SCREENING FOR COLORECTAL CANCER: ICD-10-CM

## 2018-06-21 PROCEDURE — 99024 POSTOP FOLLOW-UP VISIT: CPT | Performed by: OBSTETRICS & GYNECOLOGY

## 2018-06-21 ASSESSMENT — PAIN SCALES - GENERAL: PAINLEVEL: NO PAIN (0)

## 2018-06-21 NOTE — PROGRESS NOTES
Consult Notes on Referred Patient        Dr. Mayi Young MD  [facility]  [address] [place] [address]       RE: Jasmin Lopez  : 1955  ZULMA: 2018    HPI:  Jasmin Lopez is 63 year old with stage 1A, grade 1 endometrial adenocarcinoma.  She is unaccompanied today.  She is doing well post-operatively.  Eating and drinking normally.  Normal bowel and bladder function.  Minimal pain.  Minimal vaginal bleeding    Cancer Course:    Patient presented with PMB.    18:  US pelvis:  Uterus measures 9.1 x 4.3 x 5.4 cm with EMS of 19 mm.  There is a 3.8 cm posterior uterine fibroid.  Non-visualization of the ovaries    18:  EMB:  Endometrial ckxkogmtqwzdgn-Dt-iewp at the [place], Grade 1 endometrial adenocarcinoma    18:  Total laparoscopic hysterectomy, bilateral salpingo-oophorectomy, mini-laparotomy with oversewing of transverse colon, cystoscopy   Pathology:  Stage 1A, grade 1 endometrial adenocarcinoma, tumor size 1.9 cm, no myometrial invasion, no LVSI, MMR intact    Review of Systems:  Systemic           no weight changes; no fever; no chills; no night sweats; no appetite changes  Skin           no rashes, or lesions  Eye           no irritation; no changes in vision  Yoshi-Laryngeal           no dysphagia; no hoarseness  Pulmonary    no cough; no shortness of breath  Cardiovascular    no chest pain; no palpitations  Gastrointestinal    no diarrhea; + constipation; no abdominal pain; no changes in bowel  habits; no blood in stool  Genitourinary   no urinary frequency; no urinary urgency; no dysuria; no pain; no abnormal vaginal discharge; no abnormal vaginal bleeding  Breast    no breast discharge; no breast changes; no breast pain  Musculoskeletal    no myalgias; no arthralgias; no back pain  Psychiatric           no depressed mood; no anxiety    Hematologic            no tender lymph nodes; no noticeable swellings or lumps   Endocrine    no hot flashes; no heat/cold  intolerance         Neurological   no tremor; no numbness and tingling; no headaches; no difficulty sleeping; no dizziness    Obstetrics and Gynecology History:  ,  x 3  No HRT      Past Medical History:  Past Medical History:   Diagnosis Date     Depression      Diabetes (H)      Endometrial cancer (H)      Hypothyroid        Past Surgical History:  Past Surgical History:   Procedure Laterality Date     bowel obstruction      no laparotomy     CHOLECYSTECTOMY       CYSTOSCOPY N/A 2018    Procedure: CYSTOSCOPY;;  Surgeon: Christine Olmos MD;  Location: [facility] OR     LAPAROSCOPIC HYSTERECTOMY TOTAL, BILATERAL SALPINGO-OOPHORECTOMY, NODE DISSECTION, COMBINED N/A 2018    Procedure: COMBINED LAPAROSCOPIC HYSTERECTOMY TOTAL, SALPINGO-OOPHORECTOMY, NODE DISSECTION;  Laparoscopic Hysterectomy Bilateral Salpingo Oophorectomy, Cystoscopy, mini laparotomy, oversew of transverse colon;  Surgeon: Christine Olmos MD;  Location:  OR       Health Maintenance:  Last Pap Smear: No need for further pap smear exams  She has had a history of abnormal Pap smears.    Last Mammogram: Several years ago              Result: normal-has scheduled with PCP      She has not had a history of abnormal mammograms.    Last Colonoscopy: Never-but scheduled with her PCP                    Current Medications:   has a current medication list which includes the following prescription(s): acetaminophen, aspirin, calcium citrate-vitamin d, cetirizine, ibuprofen, insulin aspart, insulin degludec, levothyroxine sodium, losartan potassium, metformin hcl, multiple vitamins-minerals, omeprazole, senna-docusate, sertraline hcl, simvastatin, sitagliptin phosphate, ondansetron, oxycodone ir, and psyllium.     Allergies:   Amoxicillin; Augmentin; and Bactrim [sulfamethoxazole w/trimethoprim]      Social History:  Social History     Social History     Marital status:      Spouse name: N/A     Number of children: N/A  "    Years of education: N/A     Occupational History     Not on file.     Social History Main Topics     Smoking status: Never Smoker     Smokeless tobacco: Never Used     Alcohol use No     Drug use: No     Sexual activity: Not on file     Other Topics Concern     Not on file     Social History Narrative       Lives with , feels safe at home.  Works part time as a sitter at the hospital.  Enjoys going out to eat, walking, going to [employer].  Does not have an advanced directive on file and would like her  to be her POA.  Full code.    Family History:   The patient's family history is significant for.  Family History   Problem Relation Age of Onset     Thyroid Cancer Sister          Physical Exam:   /80  Pulse 83  Temp 98.2  F (36.8  C)  Resp 16  Ht 1.537 m (5' 0.51\")  Wt 72.6 kg (160 lb)  SpO2 97%  BMI 30.72 kg/m2  Body mass index is 30.72 kg/(m^2).    General Appearance: healthy and alert, no distress     Gastrointestinal:       abdomen soft, non-tender, non-distended, no organomegaly or masses, port sites without erythema/induration or drainage    Genitourinary: External genitalia and urethral meatus appears normal.  Vagina is smooth without nodularity or masses with a well healing vaginal cuff      Assessment:    Jasmin Lopez is a 63 year old woman with a diagnosis of stage 1A, grade 1 endometrial adenocarcinoma.     A total of 30 minutes was spent with the patient, >50% of which were spent in counseling the patient and/or treatment planning, which is separate from the 5 minutes spent on post-op issues.      Plan:     1.)    Stage 1A, grade 1 endometrial adenocarcinoma. Pathology was reviewed and she was provided with a copy of her pathology results.  Discussed no need for further therapy.  Discussed signs and symptoms of recurrence and to call if these should occur.  Discussed role of surveillance with history and physical exam and that labs/imaging would only be done based on symptoms " but not routinely.  Discussed no need for further pap smear testing.  Discussed visits every 6 months for 5 years (5/23) then annually thereafter.  These will be done with NP.  She will return in 6 months     2.) Genetic risk factors were assessed and the patient does not meet the qualifications for a referral.      3.) Labs and/or tests ordered include:  None     4.) Health maintenance issues addressed today include pt is due for a colonoscopy and mammogram which she will schedule with her PCP        Thank you for allowing us to participate in the care of your patient.         Sincerely,    Christine Dawson MD  Gynecologic Oncology  [facility]       CC  HARDIK PATEL

## 2018-06-21 NOTE — NURSING NOTE
"Oncology Rooming Note    June 21, 2018 1:08 PM   Jasmin Lopez is a 63 year old female who presents for:    Chief Complaint   Patient presents with     Oncology Clinic Visit     Post op - surgery 5/31     Initial Vitals: /80  Pulse 83  Temp 98.2  F (36.8  C)  Resp 16  Ht 1.537 m (5' 0.51\")  Wt 72.6 kg (160 lb)  SpO2 97%  BMI 30.72 kg/m2 Estimated body mass index is 30.72 kg/(m^2) as calculated from the following:    Height as of this encounter: 1.537 m (5' 0.51\").    Weight as of this encounter: 72.6 kg (160 lb). Body surface area is 1.76 meters squared.  No Pain (0) Comment: Data Unavailable   No LMP recorded. Patient has had a hysterectomy.  Allergies reviewed: Yes  Medications reviewed: Yes    Medications: Medication refills not needed today.  Pharmacy name entered into [employer]: [facility] [address] [place] [address] - [address]      5 minutes for nursing intake (face to face time)     Kat Adair LPN              "

## 2018-06-21 NOTE — MR AVS SNAPSHOT
After Visit Summary   6/21/2018    Jasmin Lopez    MRN: [id number]           Patient Information     Date Of Birth          1955        Visit Information        Provider Department      6/21/2018 12:30 PM Christine Olmos MD [facility]        Today's Diagnoses     Visit for screening mammogram    -  1    Screening for colorectal cancer        Endometrial cancer (H)          Care Instructions        Next visit with JASBIR Waters in 6 months          Follow-ups after your visit        Additional Services     GASTROENTEROLOGY ADULT REF PROCEDURE ONLY       Last Lab Result: Creatinine (mg/dL)       Date                     Value                 05/24/2018               0.67             ----------  There is no height or weight on file to calculate BMI.     Needed:  No  Language:  English    Patient will be contacted to schedule procedure.     Please be aware that coverage of these services is subject to the terms and limitations of your health insurance plan.  Call member services at your health plan with any benefit or coverage questions.  Any procedures must be performed at a [facility] facility OR coordinated by your clinic's referral office.    Please bring the following with you to your appointment:    (1) Any X-Rays, CTs or MRIs which have been performed.  Contact the facility where they were done to arrange for  prior to your scheduled appointment.    (2) List of current medications   (3) This referral request   (4) Any documents/labs given to you for this referral                  Your next 10 appointments already scheduled     Dec 19, 2018  1:00 PM CST   Return Visit with SHERRI Ceja CNP   [facility] ([facility]    [address] [place] [id number] [phone]              Who to contact     If you have questions or need follow up information about today's clinic visit or your schedule  "please contact [facility] directly at [phone].  Normal or non-critical lab and imaging results will be communicated to you by MyChart, letter or phone within 4 business days after the clinic has received the results. If you do not hear from us within 7 days, please contact the clinic through MyChart or phone. If you have a critical or abnormal lab result, we will notify you by phone as soon as possible.  Submit refill requests through FindIt or call your pharmacy and they will forward the refill request to us. Please allow 3 business days for your refill to be completed.          Additional Information About Your Visit        Care EveryWhere ID     This is your Care EveryWhere ID. This could be used by other organizations to access your [facility] medical records  [id number]        Your Vitals Were     Pulse Temperature Respirations Height Pulse Oximetry BMI (Body Mass Index)    83 98.2  F (36.8  C) 16 1.537 m (5' 0.51\") 97% 30.72 kg/m2       Blood Pressure from Last 3 Encounters:   06/21/18 121/80   06/01/18 127/66   05/24/18 132/81    Weight from Last 3 Encounters:   06/21/18 72.6 kg (160 lb)   05/31/18 72.3 kg (159 lb 6.3 oz)   05/24/18 73 kg (161 lb)              We Performed the Following     GASTROENTEROLOGY ADULT REF PROCEDURE ONLY        Primary Care Provider Office Phone # Fax #    Mayi Young [phone]       [facility] [address] [place] [address]        Equal Access to Services     [facility] JAIRO : Hadii aad ku hadasho Soomaali, waaxda luqadaha, qaybta kaalmada adeegyada, suzanne quintana. So [facility] [phone].    ATENCIÓN: Si habla magalie, tiene a smith disposición servicios gratuitos de asistencia lingüística. Llame al [phone].    We comply with applicable federal civil rights laws and [place] laws. We do not discriminate on the basis of race, color, national origin, age, disability, sex, sexual orientation, " or gender identity.            Thank you!     Thank you for choosing [facility]  for your care. Our goal is always to provide you with excellent care. Hearing back from our patients is one way we can continue to improve our services. Please take a few minutes to complete the written survey that you may receive in the mail after your visit with us. Thank you!             Your Updated Medication List - Protect others around you: Learn how to safely use, store and throw away your medicines at www.disposemymeds.org.          This list is accurate as of 6/21/18 11:59 PM.  Always use your most recent med list.                   Brand Name Dispense Instructions for use Diagnosis    acetaminophen 325 MG tablet    TYLENOL    50 tablet    Take 2 tablets (650 mg) by mouth every 4 hours as needed for other (mild pain)    S/P laparoscopic hysterectomy       aspirin 81 MG tablet      Take by mouth daily        CALCIUM + D PO           cetirizine 10 MG tablet    zyrTEC     Take 10 mg by mouth daily At night        ibuprofen 600 MG tablet    ADVIL/MOTRIN    30 tablet    Take 1 tablet (600 mg) by mouth every 6 hours as needed for pain (mild)    S/P laparoscopic hysterectomy       JANUVIA PO      Take 100 mg by mouth daily        LEVOTHYROXINE SODIUM PO      Take 137 mcg by mouth At Bedtime        LOSARTAN POTASSIUM PO           METFORMIN HCL PO      Take 500 mg by mouth 2 times daily (with meals) Take 2 tablets orally 2 times a day        MULTIVITAMIN ADULT PO           NovoLOG FLEXPEN 100 UNIT/ML injection   Generic drug:  insulin aspart      Inject Subcutaneous 3 times daily (with meals) Inject 1 unit per carb choice plus sliding scale        OMEPRAZOLE PO      Take 20 mg by mouth every morning        ondansetron 4 MG ODT tab    ZOFRAN-ODT    4 tablet    Take 1-2 tablets (4-8 mg) by mouth every 8 hours as needed for nausea Dissolve ON the tongue.    S/P laparoscopic hysterectomy       oxyCODONE IR 5 MG tablet     ROXICODONE    18 tablet    Take 1-2 tablets (5-10 mg) by mouth every 4 hours as needed for pain or other (Moderate to Severe)    S/P laparoscopic hysterectomy       psyllium Packet    METAMUCIL/KONSYL     Take 1 packet by mouth daily        senna-docusate 8.6-50 MG per tablet    SENOKOT-S;PERICOLACE    30 tablet    Take 1-2 tablets by mouth 2 times daily Take while on oral narcotics to prevent or treat constipation.    S/P laparoscopic hysterectomy       SERTRALINE HCL PO      Take 100 mg by mouth daily        SIMVASTATIN PO      Take 10 mg by mouth        TRESIBA FLEXTOUCH 200 UNIT/ML pen   Generic drug:  insulin degludec      Inject Subcutaneous daily Inject 14 units SQ every day

## 2018-06-21 NOTE — LETTER
2018         RE: Jasmin Lopez  [address]  [place] [address]        Dear Colleague,    Thank you [facility] referring your patient, [facility]min Lopez[facility]. Please see a copy of my visit note below.    Consult Notes on Referred Patient        Dr. Mayi Young MD  [facility]  [address] [facility] [place] [address]       RE: Jasmin Lopez  : 1955  ZULMA: 2018    HPI:  Jasmin Lopez is 63 year old with stage 1A, grade 1 endometrial adenocarcinoma.  She is unaccompanied today.  She is doing well post-operatively.  Eating and drinking normally.  Normal bowel and bladder function.  Minimal pain.  Minimal vaginal bleeding    Cancer Course:    Patient presented with PMB.    18:  US pelvis:  Uterus measures 9.1 x 4.3 x 5.4 cm with EMS of 19 mm.  There is a 3.8 cm posterior uterine fibroid.  Non-visualization of the ovaries    18:  EMB:  Endometrial nwkvkqioqpejsc-Zs-nqqg at the [place], Grade 1 endometrial adenocarcinoma    18:  Total laparoscopic hysterectomy, bilateral salpingo-oophorectomy, mini-laparotomy with oversewing of transverse colon, cystoscopy   Pathology:  Stage 1A, grade 1 endometrial adenocarcinoma, tumor size 1.9 cm, no myometrial invasion, no LVSI, MMR intact    Review of Systems:  Systemic           no weight changes; no fever; no chills; no night sweats; no appetite changes  Skin           no rashes, or lesions  Eye           no irritation; no changes in vision  Yoshi-Laryngeal           no dysphagia; no hoarseness  Pulmonary    no cough; no shortness of breath  Cardiovascular    no chest pain; no palpitations  Gastrointestinal    no diarrhea; + constipation; no abdominal pain; no changes in bowel  habits; no blood in stool  Genitourinary   no urinary frequency; no urinary urgency; no dysuria; no pain; no abnormal vaginal discharge; no abnormal vaginal bleeding  Breast    no breast discharge; no breast changes; no breast  pain  Musculoskeletal    no myalgias; no arthralgias; no back pain  Psychiatric           no depressed mood; no anxiety    Hematologic            no tender lymph nodes; no noticeable swellings or lumps   Endocrine    no hot flashes; no heat/cold intolerance         Neurological   no tremor; no numbness and tingling; no headaches; no difficulty sleeping; no dizziness    Obstetrics and Gynecology History:  ,  x 3  No HRT      Past Medical History:  Past Medical History:   Diagnosis Date     Depression      Diabetes (H)      Endometrial cancer (H)      Hypothyroid        Past Surgical History:  Past Surgical History:   Procedure Laterality Date     bowel obstruction      no laparotomy     CHOLECYSTECTOMY       CYSTOSCOPY N/A 2018    Procedure: CYSTOSCOPY;;  Surgeon: Christine Olmos MD;  Location: [facility] OR     LAPAROSCOPIC HYSTERECTOMY TOTAL, BILATERAL SALPINGO-OOPHORECTOMY, NODE DISSECTION, COMBINED N/A 2018    Procedure: COMBINED LAPAROSCOPIC HYSTERECTOMY TOTAL, SALPINGO-OOPHORECTOMY, NODE DISSECTION;  Laparoscopic Hysterectomy Bilateral Salpingo Oophorectomy, Cystoscopy, mini laparotomy, oversew of transverse colon;  Surgeon: Christine Olmos MD;  Location:  OR       Health Maintenance:  Last Pap Smear: No need for further pap smear exams  She has had a history of abnormal Pap smears.    Last Mammogram: Several years ago              Result: normal-has scheduled with PCP      She has not had a history of abnormal mammograms.    Last Colonoscopy: Never-but scheduled with her PCP                    Current Medications:   has a current medication list which includes the following prescription(s): acetaminophen, aspirin, calcium citrate-vitamin d, cetirizine, ibuprofen, insulin aspart, insulin degludec, levothyroxine sodium, losartan potassium, metformin hcl, multiple vitamins-minerals, omeprazole, senna-docusate, sertraline hcl, simvastatin, sitagliptin phosphate, ondansetron,  "oxycodone ir, and psyllium.     Allergies:   Amoxicillin; Augmentin; and Bactrim [sulfamethoxazole w/trimethoprim]      Social History:  Social History     Social History     Marital status:      Spouse name: N/A     Number of children: N/A     Years of education: N/A     Occupational History     Not on file.     Social History Main Topics     Smoking status: Never Smoker     Smokeless tobacco: Never Used     Alcohol use No     Drug use: No     Sexual activity: Not on file     Other Topics Concern     Not on file     Social History Narrative       Lives with , feels safe at home.  Works part time as a sitter at the hospital.  Enjoys going out to eat, walking, going to [employer].  Does not have an advanced directive on file and would like her  to be her POA.  Full code.    Family History:   The patient's family history is significant for.  Family History   Problem Relation Age of Onset     Thyroid Cancer Sister          Physical Exam:   /80  Pulse 83  Temp 98.2  F (36.8  C)  Resp 16  Ht 1.537 m (5' 0.51\")  Wt 72.6 kg (160 lb)  SpO2 97%  BMI 30.72 kg/m2  Body mass index is 30.72 kg/(m^2).    General Appearance: healthy and alert, no distress     Gastrointestinal:       abdomen soft, non-tender, non-distended, no organomegaly or masses, port sites without erythema/induration or drainage    Genitourinary: External genitalia and urethral meatus appears normal.  Vagina is smooth without nodularity or masses with a well healing vaginal cuff      Assessment:    Jasmin Lopez is a 63 year old woman with a diagnosis of stage 1A, grade 1 endometrial adenocarcinoma.     A total of 30 minutes was spent with the patient, >50% of which were spent in counseling the patient and/or treatment planning, which is separate from the 5 minutes spent on post-op issues.      Plan:     1.)    Stage 1A, grade 1 endometrial adenocarcinoma. Pathology was reviewed and she was provided with a copy of her pathology " results.  Discussed no need for further therapy.  Discussed signs and symptoms of recurrence and to call if these should occur.  Discussed role of surveillance with history and physical exam and that labs/imaging would only be done based on symptoms but not routinely.  Discussed no need for further pap smear testing.  Discussed visits every 6 months for 5 years (5/23) then annually thereafter.  These will be done with NP.  She will return in 6 months     2.) Genetic risk factors were assessed and the patient does not meet the qualifications for a referral.      3.) Labs and/or tests ordered include:  None     4.) Health maintenance issues addressed today include pt is due for a colonoscopy and mammogram which she will schedule with her PCP        Thank you for allowing us to participate in the care of your patient.         Sincerely,    Christine Dawson MD  Gynecologic Oncology  [facility]       HARDIK CABALLERO

## 2018-07-12 ENCOUNTER — TELEPHONE (OUTPATIENT)
Dept: ONCOLOGY | Facility: CLINIC | Age: 63
End: 2018-07-12

## 2018-07-12 NOTE — TELEPHONE ENCOUNTER
Patient calling to request a return to work letter. She is inquiring if she will have any restrictions. Writer reviewed with Dr Dawson who advised ok for patient to return to work without restrictions. Letter written and will be faxed tomorrow after Dr Dawson signs. Patient notified and asked letter be faxed to [facility] attramy Menjivar at [phone]  Alysha Parikh  RN, BSN, OCN    RTW letter faxed 7/13/18 to [facility]  Alysha Parikh  RN, BSN, OCN

## 2018-12-19 ENCOUNTER — ONCOLOGY VISIT (OUTPATIENT)
Dept: ONCOLOGY | Facility: CLINIC | Age: 63
End: 2018-12-19
Payer: COMMERCIAL

## 2018-12-19 VITALS
OXYGEN SATURATION: 98 % | DIASTOLIC BLOOD PRESSURE: 80 MMHG | RESPIRATION RATE: 16 BRPM | HEIGHT: 61 IN | TEMPERATURE: 98.7 F | WEIGHT: 167 LBS | HEART RATE: 78 BPM | SYSTOLIC BLOOD PRESSURE: 121 MMHG | BODY MASS INDEX: 31.53 KG/M2

## 2018-12-19 DIAGNOSIS — C54.1 ENDOMETRIAL CANCER (H): Primary | ICD-10-CM

## 2018-12-19 PROCEDURE — 99213 OFFICE O/P EST LOW 20 MIN: CPT | Performed by: NURSE PRACTITIONER

## 2018-12-19 ASSESSMENT — MIFFLIN-ST. JEOR: SCORE: 1237.11

## 2018-12-19 ASSESSMENT — PAIN SCALES - GENERAL: PAINLEVEL: NO PAIN (0)

## 2018-12-19 NOTE — PROGRESS NOTES
Follow Up Notes on Referred Patient    Date: 2018        RE: Jasmin Lopez  : 1955  ZULMA: 2018      Jasmin Lopez is a 63 year old woman with a diagnosis of stage 1A, grade 1 endometrial adenocarcinoma.  She is here for a surveillance visit.      Cancer Course:     Patient presented with PMB.     18:  US pelvis:  Uterus measures 9.1 x 4.3 x 5.4 cm with EMS of 19 mm.  There is a 3.8 cm posterior uterine fibroid.  Non-visualization of the ovaries     18:  EMB:  Endometrial [id number] at the [facility], Grade 1 endometrial adenocarcinoma     18:  Total laparoscopic hysterectomy, bilateral salpingo-oophorectomy, mini-laparotomy with oversewing of transverse colon, cystoscopy                 Pathology:  Stage 1A, grade 1 endometrial adenocarcinoma, tumor size 1.9 cm, no myometrial invasion, no LVSI, MMR intact      Today she comes to clinic and denies any concerns for her visit. She denies any vaginal bleeding, no changes in her bowel (has some issues with constipation at times) or bladder habits, no nausea/emesis, no lower extremity edema, and no difficulties eating or sleeping. She denies any abdominal discomfort/bloating, no fevers or chills, and no chest pain or shortness of breath. She is current with her health screenings, is seen by a DM provider,and is not sexually active. She is planning on going to [place]  and staying about 3.5 months. Her hot flashes she has had since menopause and are about the same.        Review of Systems:    Systemic           no weight changes; no fever; no chills; no night sweats; no appetite changes  Skin           no rashes, or lesions  Eye           no irritation; no changes in vision  Yoshi-Laryngeal           no dysphagia; no hoarseness   Pulmonary    no cough; no shortness of breath  Cardiovascular    no chest pain; no palpitations  Gastrointestinal    no diarrhea; no constipation; no abdominal pain; no changes in bowel  habits; no blood in stool  Genitourinary   no urinary frequency; no urinary urgency; no dysuria; no pain; no abnormal vaginal discharge; no abnormal vaginal bleeding  Breast    no breast discharge; no breast changes; no breast pain  Musculoskeletal    no myalgias; no arthralgias; no back pain  Psychiatric           no depressed mood; no anxiety    Hematologic               no tender lymph nodes; no noticeable swellings or lumps   Endocrine    + hot flashes; no heat/cold intolerance         Neurological   no tremor; no numbness and tingling; no headaches; no difficulty sleeping      Past Medical History:    Past Medical History:   Diagnosis Date     Depression      Diabetes (H)      Endometrial cancer (H)      Hypothyroid          Past Surgical History:    Past Surgical History:   Procedure Laterality Date     bowel obstruction      no laparotomy     CHOLECYSTECTOMY       CYSTOSCOPY N/A 5/31/2018    Procedure: CYSTOSCOPY;;  Surgeon: Christine Olmos MD;  Location: [facility] OR     LAPAROSCOPIC HYSTERECTOMY TOTAL, BILATERAL SALPINGO-OOPHORECTOMY, NODE DISSECTION, COMBINED N/A 5/31/2018    Procedure: COMBINED LAPAROSCOPIC HYSTERECTOMY TOTAL, SALPINGO-OOPHORECTOMY, NODE DISSECTION;  Laparoscopic Hysterectomy Bilateral Salpingo Oophorectomy, Cystoscopy, mini laparotomy, oversew of transverse colon;  Surgeon: Christine Olmos MD;  Location: [facility] OR         Health Maintenance Due   Topic Date Due     PHQ-2 Q1 YR  06/02/1966     HIV SCREEN (SYSTEM ASSIGNED)  06/02/1972     HEPATITIS C SCREENING  06/02/1972     PAP SCREENING Q3 YR (SYSTEM ASSIGNED)  06/02/1975     DTAP/TDAP/TD IMMUNIZATION (1 - Tdap) 06/02/1979     MAMMO SCREEN Q2 YR (SYSTEM ASSIGNED)  06/02/1994     LIPID SCREEN Q5 YR FEMALE (SYSTEM ASSIGNED)  06/02/1999     COLON CANCER SCREEN (SYSTEM ASSIGNED)  06/02/2004     ZOSTER IMMUNIZATION (1 of 2) 06/02/2004     ADVANCE DIRECTIVE PLANNING Q5 YRS  06/02/2009     INFLUENZA VACCINE (1) 09/01/2018        Current Medications:     Current Outpatient Medications   Medication Sig Dispense Refill     aspirin 81 MG tablet Take by mouth daily       Calcium Citrate-Vitamin D (CALCIUM + D PO)        cetirizine (ZYRTEC) 10 MG tablet Take 10 mg by mouth daily At night       ibuprofen (ADVIL/MOTRIN) 600 MG tablet Take 1 tablet (600 mg) by mouth every 6 hours as needed for pain (mild) 30 tablet 0     insulin aspart (NOVOLOG FLEXPEN) 100 UNIT/ML injection Inject Subcutaneous 3 times daily (with meals) Inject 1 unit per carb choice plus sliding scale       insulin degludec (TRESIBA FLEXTOUCH) 200 UNIT/ML pen Inject Subcutaneous daily Inject 14 units SQ every day       LEVOTHYROXINE SODIUM PO Take 137 mcg by mouth At Bedtime        LOSARTAN POTASSIUM PO        METFORMIN HCL PO Take 500 mg by mouth 2 times daily (with meals) Take 2 tablets orally 2 times a day       Multiple Vitamins-Minerals (MULTIVITAMIN ADULT PO)        OMEPRAZOLE PO Take 20 mg by mouth every morning       psyllium (METAMUCIL/KONSYL) Packet Take 1 packet by mouth daily       SERTRALINE HCL PO Take 100 mg by mouth daily        SIMVASTATIN PO Take 10 mg by mouth        SITagliptin Phosphate (JANUVIA PO) Take 100 mg by mouth daily       acetaminophen (TYLENOL) 325 MG tablet Take 2 tablets (650 mg) by mouth every 4 hours as needed for other (mild pain) (Patient not taking: Reported on 12/19/2018) 50 tablet 0     ondansetron (ZOFRAN-ODT) 4 MG ODT tab Take 1-2 tablets (4-8 mg) by mouth every 8 hours as needed for nausea Dissolve ON the tongue. (Patient not taking: Reported on 6/21/2018) 4 tablet 0     oxyCODONE IR (ROXICODONE) 5 MG tablet Take 1-2 tablets (5-10 mg) by mouth every 4 hours as needed for pain or other (Moderate to Severe) (Patient not taking: Reported on 12/19/2018) 18 tablet 0     senna-docusate (SENOKOT-S;PERICOLACE) 8.6-50 MG per tablet Take 1-2 tablets by mouth 2 times daily Take while on oral narcotics to prevent or treat constipation. (Patient  "not taking: Reported on 12/19/2018) 30 tablet 0         Allergies:        Allergies   Allergen Reactions     Amoxicillin      Augmentin      Bactrim [Sulfamethoxazole W/Trimethoprim]         Social History:     Social History     Tobacco Use     Smoking status: Never Smoker     Smokeless tobacco: Never Used   Substance Use Topics     Alcohol use: No       History   Drug Use No         Family History:     The patient's family history is notable for:    Family History   Problem Relation Age of Onset     Thyroid Cancer Sister          Physical Exam:     /80 (BP Location: Right arm)   Pulse 78   Temp 98.7  F (37.1  C) (Oral)   Resp 16   Ht 1.537 m (5' 0.51\")   Wt 75.8 kg (167 lb)   SpO2 98%   BMI 32.07 kg/m    Body mass index is 32.07 kg/m .    General Appearance: healthy and alert, no distress     HEENT: no thyromegaly, no palpable nodules or masses        Cardiovascular: regular rate and rhythm, no gallops, rubs or murmurs     Respiratory: lungs clear, no rales, rhonchi or wheezes, normal diaphragmatic excursion    Musculoskeletal: extremities non tender and without edema    Skin: no lesions or rashes     Neurological: normal gait, no gross defects     Psychiatric: appropriate mood and affect                               Hematological: normal cervical, supraclavicular and inguinal lymph nodes     Gastrointestinal:       abdomen soft, non-tender, non-distended, no organomegaly or masses    Genitourinary: External genitalia and urethral meatus appears normal.  Vagina is smooth without nodularity or masses.  Cervix surgically absent.  Bimanual exam reveal no masses, nodularity or fullness.  Recto-vaginal exam confirms these findings.      Assessment:    Jasmin Lopez is a 63 year old woman with a diagnosis of stage 1A, grade 1 endometrial adenocarcinoma.  She is here for a surveillance visit.     20 minutes were spent with this patient, over 50% of that time was spent in symptom management, treatment " planning and in counseling and coordination of care.      Plan:     1.)        Discussed updated surveillance for low stage/risk endometrial cancer per our group which is every 6 months x 2 years and then annually thereafter. She will be having her remaining surveillance visits with her local provider. Reviewed recommendations from [facility] not to perform surveillance pap smears in women diagnosed with endometrial cancer as this does not improve detection of local recurrence. Reviewed signs and symptoms for when she should contact the clinic or seek additional care. Patient to contact the clinic with any questions or concerns.     2.) Genetic risk factors were assessed and her MMR was intact.     3.) Labs and/or tests ordered include:  None.     4.) Health maintenance issues addressed today include annual health maintenance and non-gynecologic issues with PCP.    SHERRI Orozco, WHNP-BC, ANP-BC  Women's Health Nurse Practitioner  Adult Nurse Pracitioner  Division of Gynecologic Oncology          CC  Patient Care Team:  Mayi Young MD as PCP - General (Family Practice)

## 2018-12-19 NOTE — NURSING NOTE
"Oncology Rooming Note    December 19, 2018 1:13 PM   Jasmin Lopez is a 63 year old female who presents for:    Chief Complaint   Patient presents with     Oncology Clinic Visit     Follow Up     Initial Vitals: /80 (BP Location: Right arm)   Pulse 78   Temp 98.7  F (37.1  C) (Oral)   Resp 16   Ht 1.537 m (5' 0.51\")   Wt 75.8 kg (167 lb)   SpO2 98%   BMI 32.07 kg/m   Estimated body mass index is 32.07 kg/m  as calculated from the following:    Height as of this encounter: 1.537 m (5' 0.51\").    Weight as of this encounter: 75.8 kg (167 lb). Body surface area is 1.8 meters squared.  No Pain (0) Comment: Data Unavailable   No LMP recorded. Patient has had a hysterectomy.  Allergies reviewed: Yes  Medications reviewed: Yes    Medications: Medication refills not needed today.  Pharmacy name entered into [employer]: [facility] [address] [place] [address], [place] - [address]      5 minutes for nursing intake (face to face time)     Evelina Yo LPN              "

## 2018-12-19 NOTE — LETTER
2018         RE: Jasmin Lopez  [address]  [facility] [address]        Dear Colleague,    Thank you for referring your patient, Jasmin Lopez, to the [facility]. Please see a copy of my visit note below.                Follow Up Notes on Referred Patient    Date: 2018        RE: Jasmin Lopez  : 1955  ZULMA: 2018      Jasmin Lopez is a 63 year old woman with a diagnosis of stage 1A, grade 1 endometrial adenocarcinoma.  She is here for a surveillance visit.      Cancer Course:     Patient presented with PMB.     18:  US pelvis:  Uterus measures 9.1 x 4.3 x 5.4 cm with EMS of 19 mm.  There is a 3.8 cm posterior uterine fibroid.  Non-visualization of the ovaries     18:  EMB:  Endometrial [id number] at the [facility], Grade 1 endometrial adenocarcinoma     18:  Total laparoscopic hysterectomy, bilateral salpingo-oophorectomy, mini-laparotomy with oversewing of transverse colon, cystoscopy                 Pathology:  Stage 1A, grade 1 endometrial adenocarcinoma, tumor size 1.9 cm, no myometrial invasion, no LVSI, MMR intact      Today she comes to clinic and denies any concerns for her visit. She denies any vaginal bleeding, no changes in her bowel (has some issues with constipation at times) or bladder habits, no nausea/emesis, no lower extremity edema, and no difficulties eating or sleeping. She denies any abdominal discomfort/bloating, no fevers or chills, and no chest pain or shortness of breath. She is current with her health screenings, is seen by a DM provider,and is not sexually active. She is planning on going to [place]  and staying about 3.5 months. Her hot flashes she has had since menopause and are about the same.        Review of Systems:    Systemic           no weight changes; no fever; no chills; no night sweats; no appetite changes  Skin           no rashes, or lesions  Eye           no irritation; no changes in  vision  Yoshi-Laryngeal           no dysphagia; no hoarseness   Pulmonary    no cough; no shortness of breath  Cardiovascular    no chest pain; no palpitations  Gastrointestinal    no diarrhea; no constipation; no abdominal pain; no changes in bowel habits; no blood in stool  Genitourinary   no urinary frequency; no urinary urgency; no dysuria; no pain; no abnormal vaginal discharge; no abnormal vaginal bleeding  Breast    no breast discharge; no breast changes; no breast pain  Musculoskeletal    no myalgias; no arthralgias; no back pain  Psychiatric           no depressed mood; no anxiety    Hematologic               no tender lymph nodes; no noticeable swellings or lumps   Endocrine    + hot flashes; no heat/cold intolerance         Neurological   no tremor; no numbness and tingling; no headaches; no difficulty sleeping      Past Medical History:    Past Medical History:   Diagnosis Date     Depression      Diabetes (H)      Endometrial cancer (H)      Hypothyroid          Past Surgical History:    Past Surgical History:   Procedure Laterality Date     bowel obstruction      no laparotomy     CHOLECYSTECTOMY       CYSTOSCOPY N/A 5/31/2018    Procedure: CYSTOSCOPY;;  Surgeon: Christine Olmos MD;  Location: [facility] OR     LAPAROSCOPIC HYSTERECTOMY TOTAL, BILATERAL SALPINGO-OOPHORECTOMY, NODE DISSECTION, COMBINED N/A 5/31/2018    Procedure: COMBINED LAPAROSCOPIC HYSTERECTOMY TOTAL, SALPINGO-OOPHORECTOMY, NODE DISSECTION;  Laparoscopic Hysterectomy Bilateral Salpingo Oophorectomy, Cystoscopy, mini laparotomy, oversew of transverse colon;  Surgeon: Christine Olmos MD;  Location:  OR         Health Maintenance Due   Topic Date Due     PHQ-2 Q1 YR  06/02/1966     HIV SCREEN (SYSTEM ASSIGNED)  06/02/1972     HEPATITIS C SCREENING  06/02/1972     PAP SCREENING Q3 YR (SYSTEM ASSIGNED)  06/02/1975     DTAP/TDAP/TD IMMUNIZATION (1 - Tdap) 06/02/1979     MAMMO SCREEN Q2 YR (SYSTEM ASSIGNED)  06/02/1994     LIPID  SCREEN Q5 YR FEMALE (SYSTEM ASSIGNED)  06/02/1999     COLON CANCER SCREEN (SYSTEM ASSIGNED)  06/02/2004     ZOSTER IMMUNIZATION (1 of 2) 06/02/2004     ADVANCE DIRECTIVE PLANNING Q5 YRS  06/02/2009     INFLUENZA VACCINE (1) 09/01/2018       Current Medications:     Current Outpatient Medications   Medication Sig Dispense Refill     aspirin 81 MG tablet Take by mouth daily       Calcium Citrate-Vitamin D (CALCIUM + D PO)        cetirizine (ZYRTEC) 10 MG tablet Take 10 mg by mouth daily At night       ibuprofen (ADVIL/MOTRIN) 600 MG tablet Take 1 tablet (600 mg) by mouth every 6 hours as needed for pain (mild) 30 tablet 0     insulin aspart (NOVOLOG FLEXPEN) 100 UNIT/ML injection Inject Subcutaneous 3 times daily (with meals) Inject 1 unit per carb choice plus sliding scale       insulin degludec (TRESIBA FLEXTOUCH) 200 UNIT/ML pen Inject Subcutaneous daily Inject 14 units SQ every day       LEVOTHYROXINE SODIUM PO Take 137 mcg by mouth At Bedtime        LOSARTAN POTASSIUM PO        METFORMIN HCL PO Take 500 mg by mouth 2 times daily (with meals) Take 2 tablets orally 2 times a day       Multiple Vitamins-Minerals (MULTIVITAMIN ADULT PO)        OMEPRAZOLE PO Take 20 mg by mouth every morning       psyllium (METAMUCIL/KONSYL) Packet Take 1 packet by mouth daily       SERTRALINE HCL PO Take 100 mg by mouth daily        SIMVASTATIN PO Take 10 mg by mouth        SITagliptin Phosphate (JANUVIA PO) Take 100 mg by mouth daily       acetaminophen (TYLENOL) 325 MG tablet Take 2 tablets (650 mg) by mouth every 4 hours as needed for other (mild pain) (Patient not taking: Reported on 12/19/2018) 50 tablet 0     ondansetron (ZOFRAN-ODT) 4 MG ODT tab Take 1-2 tablets (4-8 mg) by mouth every 8 hours as needed for nausea Dissolve ON the tongue. (Patient not taking: Reported on 6/21/2018) 4 tablet 0     oxyCODONE IR (ROXICODONE) 5 MG tablet Take 1-2 tablets (5-10 mg) by mouth every 4 hours as needed for pain or other (Moderate to  "Severe) (Patient not taking: Reported on 12/19/2018) 18 tablet 0     senna-docusate (SENOKOT-S;PERICOLACE) 8.6-50 MG per tablet Take 1-2 tablets by mouth 2 times daily Take while on oral narcotics to prevent or treat constipation. (Patient not taking: Reported on 12/19/2018) 30 tablet 0         Allergies:        Allergies   Allergen Reactions     Amoxicillin      Augmentin      Bactrim [Sulfamethoxazole W/Trimethoprim]         Social History:     Social History     Tobacco Use     Smoking status: Never Smoker     Smokeless tobacco: Never Used   Substance Use Topics     Alcohol use: No       History   Drug Use No         Family History:     The patient's family history is notable for:    Family History   Problem Relation Age of Onset     Thyroid Cancer Sister          Physical Exam:     /80 (BP Location: Right arm)   Pulse 78   Temp 98.7  F (37.1  C) (Oral)   Resp 16   Ht 1.537 m (5' 0.51\")   Wt 75.8 kg (167 lb)   SpO2 98%   BMI 32.07 kg/m     Body mass index is 32.07 kg/m .    General Appearance: healthy and alert, no distress     HEENT: no thyromegaly, no palpable nodules or masses        Cardiovascular: regular rate and rhythm, no gallops, rubs or murmurs     Respiratory: lungs clear, no rales, rhonchi or wheezes, normal diaphragmatic excursion    Musculoskeletal: extremities non tender and without edema    Skin: no lesions or rashes     Neurological: normal gait, no gross defects     Psychiatric: appropriate mood and affect                               Hematological: normal cervical, supraclavicular and inguinal lymph nodes     Gastrointestinal:       abdomen soft, non-tender, non-distended, no organomegaly or masses    Genitourinary: External genitalia and urethral meatus appears normal.  Vagina is smooth without nodularity or masses.  Cervix surgically absent.  Bimanual exam reveal no masses, nodularity or fullness.  Recto-vaginal exam confirms these findings.      Assessment:    Jasmin Lopez is a " 63 year old woman with a diagnosis of stage 1A, grade 1 endometrial adenocarcinoma.  She is here for a surveillance visit.     20 minutes were spent with this patient, over 50% of that time was spent in symptom management, treatment planning and in counseling and coordination of care.      Plan:     1.)        Discussed updated surveillance for low stage/risk endometrial cancer per our group which is every 6 months x 2 years and then annually thereafter. She will be having her remaining surveillance visits with her local provider. Reviewed recommendations from [facility] not to perform surveillance pap smears in women diagnosed with endometrial cancer as this does not improve detection of local recurrence. Reviewed signs and symptoms for when she should contact the clinic or seek additional care. Patient to contact the clinic with any questions or concerns.     2.) Genetic risk factors were assessed and her MMR was intact.     3.) Labs and/or tests ordered include:  None.     4.) Health maintenance issues addressed today include annual health maintenance and non-gynecologic issues with PCP.    SHERRI Orozco, WHNP-BC, ANP-BC  Women's Health Nurse Practitioner  Adult Nurse Pracitioner  Division of Gynecologic Oncology          CC  Patient Care Team:  Mayi Young MD as PCP - General (Family Practice)      Again, thank you for allowing me to participate in the care of your patient.        Sincerely,        SHERRI Ceja CNP

## (undated) DEVICE — Device

## (undated) DEVICE — PREP CHLORAPREP 26ML TINTED ORANGE  260815

## (undated) DEVICE — DRAPE SHEET MED 44X70" 9355

## (undated) DEVICE — SU DERMABOND ADVANCED .7ML DNX12

## (undated) DEVICE — SU SILK 3-0 SH CR 8X18" C013D

## (undated) DEVICE — SOL NACL 0.9% INJ 1000ML BAG 07983-09

## (undated) DEVICE — CATH TRAY FOLEY 16FR W/URINE METER STATLOCK 303316A

## (undated) DEVICE — DEVICE SUTURE GRASPER TROCAR CLOSURE 14GA PMITCSG

## (undated) DEVICE — GLOVE PROTEXIS BLUE W/NEU-THERA 7.0  2D73EB70

## (undated) DEVICE — KOH COLPOTOMIZER OCCLUDER  CPO-6

## (undated) DEVICE — GLOVE PROTEXIS POWDER FREE SMT 6.5  2D72PT65X

## (undated) DEVICE — ENDO TROCAR 05MM VERSAONE BLADELESS W/STD FIX CAN NONB5STF

## (undated) DEVICE — TUBING SUCTION 10'X3/16" N510

## (undated) DEVICE — SUCTION MANIFOLD DORNOCH ULTRA CART UL-CL500

## (undated) DEVICE — KIT PATIENT POSITIONING PIGAZZI LATEX FREE 40580

## (undated) DEVICE — SUCTION IRR STRYKERFLOW II W/TIP 250-070-520

## (undated) DEVICE — NDL INSUFFLATION 120MM VERRES 172015

## (undated) DEVICE — SU WND CLOSURE RELOAD VLOC 180 ABS 0 GREEN 8" VLOCA008L

## (undated) DEVICE — SU VICRYL 4-0 PS-2 18" UND J496H

## (undated) DEVICE — JELLY LUBRICATING SURGILUBE 2OZ TUBE

## (undated) DEVICE — SU PDS II 0 TP-1 60" Z991G

## (undated) DEVICE — DEVICE ENDO STITCH APPLIER 10MM 173016

## (undated) DEVICE — ESU PENCIL W/ROCKER SWITCH BLADE HOLSTER E2350HDB

## (undated) DEVICE — SUCTION TIP YANKAUER STR K87

## (undated) DEVICE — ENDO TROCAR 12MM VERSAONE BLADELESS W/STD FIX CAN NONB12STF

## (undated) DEVICE — ESU GROUND PAD ADULT W/CORD E7507

## (undated) DEVICE — TUBING IRRIG CYSTO/BLADDER SET 81" LF 2C4040

## (undated) DEVICE — ENDO SCOPE WARMER SEAL  C3101

## (undated) DEVICE — SPONGE LAP 18X18" X8435

## (undated) DEVICE — CONNECTOR WATER VALVE PERFUSION PACK STR 020272801

## (undated) DEVICE — LINEN TOWEL PACK X6 WHITE 5487

## (undated) DEVICE — ESU LIGASURE LAPAROSCOPIC BLUNT TIP SEALER 5MMX37CM LF1837

## (undated) DEVICE — SYR 01ML 27GA 0.5" NDL TBC 309623

## (undated) DEVICE — ENDO SHEARS 5MM 176643

## (undated) DEVICE — SU VICRYL 0 TIE 54" J608H

## (undated) DEVICE — SPECIMEN TRAP MUCOUS 40ML LUKI C30200A

## (undated) DEVICE — LINEN TOWEL PACK X30 5481

## (undated) DEVICE — RETR ELEV / UTERINE MANIPULATOR V-CARE MED CUP 60-6085-201A

## (undated) DEVICE — SOL WATER IRRIG 1000ML BOTTLE 2F7114

## (undated) DEVICE — ENDO CANNULA 05MM VERSAONE UNIVERSAL UNVCA5STF

## (undated) RX ORDER — GABAPENTIN 300 MG/1
CAPSULE ORAL
Status: DISPENSED
Start: 2018-05-31

## (undated) RX ORDER — CEFAZOLIN SODIUM 2 G/100ML
INJECTION, SOLUTION INTRAVENOUS
Status: DISPENSED
Start: 2018-05-31

## (undated) RX ORDER — FENTANYL CITRATE 50 UG/ML
INJECTION, SOLUTION INTRAMUSCULAR; INTRAVENOUS
Status: DISPENSED
Start: 2018-05-31

## (undated) RX ORDER — HYDROMORPHONE HYDROCHLORIDE 1 MG/ML
INJECTION, SOLUTION INTRAMUSCULAR; INTRAVENOUS; SUBCUTANEOUS
Status: DISPENSED
Start: 2018-05-31

## (undated) RX ORDER — PHENAZOPYRIDINE HYDROCHLORIDE 200 MG/1
TABLET, FILM COATED ORAL
Status: DISPENSED
Start: 2018-05-31

## (undated) RX ORDER — ACETAMINOPHEN 325 MG/1
TABLET ORAL
Status: DISPENSED
Start: 2018-05-31

## (undated) RX ORDER — ONDANSETRON 2 MG/ML
INJECTION INTRAMUSCULAR; INTRAVENOUS
Status: DISPENSED
Start: 2018-05-31

## (undated) RX ORDER — KETOROLAC TROMETHAMINE 30 MG/ML
INJECTION, SOLUTION INTRAMUSCULAR; INTRAVENOUS
Status: DISPENSED
Start: 2018-05-31

## (undated) RX ORDER — HEPARIN SODIUM 5000 [USP'U]/.5ML
INJECTION, SOLUTION INTRAVENOUS; SUBCUTANEOUS
Status: DISPENSED
Start: 2018-05-31

## (undated) RX ORDER — SODIUM CHLORIDE, SODIUM LACTATE, POTASSIUM CHLORIDE, CALCIUM CHLORIDE 600; 310; 30; 20 MG/100ML; MG/100ML; MG/100ML; MG/100ML
INJECTION, SOLUTION INTRAVENOUS
Status: DISPENSED
Start: 2018-05-31

## (undated) RX ORDER — HEPARIN SODIUM 1000 [USP'U]/ML
INJECTION, SOLUTION INTRAVENOUS; SUBCUTANEOUS
Status: DISPENSED
Start: 2018-05-31